# Patient Record
Sex: FEMALE | Employment: FULL TIME | URBAN - METROPOLITAN AREA
[De-identification: names, ages, dates, MRNs, and addresses within clinical notes are randomized per-mention and may not be internally consistent; named-entity substitution may affect disease eponyms.]

---

## 2020-09-18 ENCOUNTER — VIRTUAL VISIT (OUTPATIENT)
Dept: FAMILY MEDICINE CLINIC | Age: 30
End: 2020-09-18

## 2020-09-18 VITALS
BODY MASS INDEX: 26.03 KG/M2 | DIASTOLIC BLOOD PRESSURE: 70 MMHG | TEMPERATURE: 98.5 F | HEIGHT: 64 IN | OXYGEN SATURATION: 97 % | HEART RATE: 79 BPM | SYSTOLIC BLOOD PRESSURE: 110 MMHG | WEIGHT: 152.5 LBS

## 2020-09-18 DIAGNOSIS — G43.901 MIGRAINE WITH STATUS MIGRAINOSUS, NOT INTRACTABLE, UNSPECIFIED MIGRAINE TYPE: Primary | ICD-10-CM

## 2020-09-18 RX ORDER — SERTRALINE HYDROCHLORIDE 100 MG/1
TABLET, FILM COATED ORAL DAILY
COMMUNITY
End: 2020-12-17

## 2020-09-18 RX ORDER — CYCLOBENZAPRINE HCL 10 MG
10 TABLET ORAL
COMMUNITY
End: 2020-12-17

## 2020-09-18 RX ORDER — CHOLECALCIFEROL (VITAMIN D3) 125 MCG
CAPSULE ORAL
COMMUNITY

## 2020-09-18 RX ORDER — IBUPROFEN 800 MG/1
TABLET ORAL
COMMUNITY

## 2020-09-18 RX ORDER — BUTALBITAL, ACETAMINOPHEN AND CAFFEINE 50; 325; 40 MG/1; MG/1; MG/1
1 TABLET ORAL
COMMUNITY
End: 2021-01-22 | Stop reason: SDUPTHER

## 2020-09-21 ENCOUNTER — TELEPHONE (OUTPATIENT)
Dept: FAMILY MEDICINE CLINIC | Age: 30
End: 2020-09-21

## 2020-09-21 NOTE — TELEPHONE ENCOUNTER
Patient has a VV Friday bust missed it so called today for a referral to a neurologist for severe migrans,still having them face is even swollen.

## 2020-09-22 NOTE — TELEPHONE ENCOUNTER
Patient states that she does not have a headache right now, but had one yesterday for over 2 hours. She states that she thinks stress brought that one on. She was advised to go to . Patient states she needs to make an appt for a physical  As well. Patient was advised I could transfer her to the appt  and she states she will call back after she looks at her schedule to make that appt.

## 2020-10-05 ENCOUNTER — TELEPHONE (OUTPATIENT)
Dept: FAMILY MEDICINE CLINIC | Age: 30
End: 2020-10-05

## 2020-10-05 NOTE — TELEPHONE ENCOUNTER
Pt waiting on her COVID test to come back but still running a fever and if its negative can she follow up here?  Please advise

## 2020-12-17 ENCOUNTER — VIRTUAL VISIT (OUTPATIENT)
Dept: FAMILY MEDICINE CLINIC | Age: 30
End: 2020-12-17
Payer: COMMERCIAL

## 2020-12-17 DIAGNOSIS — G43.901 MIGRAINE WITH STATUS MIGRAINOSUS, NOT INTRACTABLE, UNSPECIFIED MIGRAINE TYPE: Primary | ICD-10-CM

## 2020-12-17 DIAGNOSIS — Z78.9 BREASTFEEDING (INFANT): ICD-10-CM

## 2020-12-17 PROCEDURE — 99213 OFFICE O/P EST LOW 20 MIN: CPT | Performed by: NURSE PRACTITIONER

## 2020-12-17 RX ORDER — TRIAMCINOLONE ACETONIDE 1 MG/G
OINTMENT TOPICAL
COMMUNITY
Start: 2020-11-16 | End: 2021-08-30

## 2020-12-17 RX ORDER — BUTALBITAL, ASPIRIN, AND CAFFEINE 325; 50; 40 MG/1; MG/1; MG/1
CAPSULE ORAL
COMMUNITY
Start: 2020-10-07 | End: 2021-01-05

## 2020-12-17 RX ORDER — ONDANSETRON 4 MG/1
TABLET, ORALLY DISINTEGRATING ORAL
COMMUNITY
Start: 2020-10-05 | End: 2022-05-12 | Stop reason: ALTCHOICE

## 2020-12-17 NOTE — PROGRESS NOTES
Consent: Hue Gabriel, who was seen by synchronous (real-time) audio-video technology, and/or her healthcare decision maker, is aware that this patient-initiated, Telehealth encounter on 12/17/2020 is a billable service, with coverage as determined by her insurance carrier. She is aware that she may receive a bill and has provided verbal consent to proceed: YES-Consent obtained within past 12 months        712  Subjective:   Hue Gabriel is a 31 y.o. female who was seen for Follow-up (migraine follow up, paperwork)  Patient presents to discuss [facility] paperwork for migraines. Migraines are occurring 5 times per month and have been worse due to PPE requirements at work (N95 and face shield). Migraines last 2 days and are accompanied by nausea, occassionally emesis and photosensitivity. She has missed 3 days of work this month and none last month. Last seen for migraines at [facility] in October and prescribed Fiorcet and Zofran. Fiorcet sometimes helps migraines but not always. Tylenol 1000mg once also offers some relief but does not resolved migraines. Also requesting refill of prenatal vitamins. Continues to pump once daily. Prior to Admission medications    Medication Sig Start Date End Date Taking? Authorizing Provider   butalbital-aspirin-caffeine (FIORINAL) capsule TK 1 TO 2 CS PO Q 4 H PRF HA. MDD 6 CS. 10/7/20  Yes Provider, Historical   ondansetron (ZOFRAN ODT) 4 mg disintegrating tablet DIS 1 T ON THE TONGUE Q 8 H PRF NAUSEA OR VOM 10/5/20  Yes Provider, Historical   triamcinolone acetonide (KENALOG) 0.1 % ointment BALAJI TO LEGS BID FOR 14 DAYS THEN PRF FLARES 11/16/20  Yes Provider, Historical   prenatal vit-calcium-iron-fa (PRENATAL PLUS with CALCIUM) 27 mg iron- 1 mg tab Take 1 Tab by mouth daily. 12/17/20  Yes Annette Colón NP   butalbital-acetaminophen-caffeine (FIORICET, ESGIC) -40 mg per tablet Take 1 Tab by mouth.    Yes Provider, Historical   cholecalciferol, vitamin D3, 50 mcg (2,000 unit) tab Take  by mouth. Yes Provider, Historical   ibuprofen (MOTRIN) 800 mg tablet Take  by mouth. Yes Provider, Historical   prenatal vit-calcium-iron-fa (PRENATAL PLUS with CALCIUM) 27 mg iron- 1 mg tab Take 1 Tab by mouth daily. 12/17/20  Provider, Historical   sertraline (ZOLOFT) 100 mg tablet Take  by mouth daily. 12/17/20  Provider, Historical   cyclobenzaprine (FLEXERIL) 10 mg tablet Take 10 mg by mouth three (3) times daily as needed for Muscle Spasm(s). 12/17/20  Provider, Historical     Allergies   Allergen Reactions    Aspirin Other (comments)     Patient Active Problem List    Diagnosis    Breastfeeding (infant)    Migraine    Vitamin D deficiency         ROS  See HPI for pertinent ROS. Objective:   Vital Signs: (As obtained by patient/caregiver at home)  There were no vitals taken for this visit.      [INSTRUCTIONS:  \"[x]\" Indicates a positive item  \"[]\" Indicates a negative item  -- DELETE ALL ITEMS NOT EXAMINED]    Constitutional: [x] Appears well-developed and well-nourished [x] No apparent distress      [] Abnormal -     Mental status: [x] Alert and awake  [x] Oriented to person/place/time [x] Able to follow commands    [] Abnormal -     Eyes:   EOM    [x]  Normal    [] Abnormal -   Sclera  [x]  Normal    [] Abnormal -          Discharge [x]  None visible   [] Abnormal -     HENT: [x] Normocephalic, atraumatic  [] Abnormal -   [x] Mouth/Throat: Mucous membranes are moist    External Ears [x] Normal  [] Abnormal -    Neck: [x] No visualized mass [] Abnormal -     Pulmonary/Chest: [x] Respiratory effort normal   [x] No visualized signs of difficulty breathing or respiratory distress        [] Abnormal -        Neurological:        [x] No Facial Asymmetry (Cranial nerve 7 motor function) (limited exam due to video visit)          [x] No gaze palsy        [] Abnormal -          Skin:        [x] No significant exanthematous lesions or discoloration noted on facial skin [] Abnormal -            Psychiatric:       [x] Normal Affect [] Abnormal -        [x] No Hallucinations    Other pertinent observable physical exam findings:-              Assessment & Plan:   Diagnoses and all orders for this visit:    1. Migraine with status migrainosus, not intractable, unspecified migraine type  Advised we will call when [facility] paperwork is available for pickup. Discouraged use of Fiorcet while breastfeeding. 2. Breastfeeding (infant)  Refill as requested. -     prenatal vit-calcium-iron-fa (PRENATAL PLUS with CALCIUM) 27 mg iron- 1 mg tab; Take 1 Tab by mouth daily. We discussed the expected course, resolution and complications of the diagnosis(es) in detail. Medication risks, benefits, costs, interactions, and alternatives were discussed as indicated. I advised her to contact the office if her condition worsens, changes or fails to improve as anticipated. She expressed understanding with the diagnosis(es) and plan. Jamal Stout is a 31 y.o. female being evaluated by a video visit encounter for concerns as above. A caregiver was present when appropriate. Due to this being a TeleHealth encounter (During [facility]- public health emergency), evaluation of the following organ systems was limited: Vitals/Constitutional/EENT/Resp/CV/GI//MS/Neuro/Skin/Heme-Lymph-Imm. Pursuant to the emergency declaration under the [facility], 1135 waiver authority and the [employer] and [employer] General Act, this Virtual  Visit was conducted, with patient's (and/or legal guardian's) consent, to reduce the patient's risk of exposure to COVID-19 and provide necessary medical care. Services were provided through a video synchronous discussion virtually to substitute for in-person clinic visit. Patient and provider were located at their individual homes.         Carlos Alberto Phillips NP

## 2021-01-05 ENCOUNTER — OFFICE VISIT (OUTPATIENT)
Dept: FAMILY MEDICINE CLINIC | Age: 31
End: 2021-01-05
Payer: COMMERCIAL

## 2021-01-05 ENCOUNTER — TELEPHONE (OUTPATIENT)
Dept: FAMILY MEDICINE CLINIC | Age: 31
End: 2021-01-05

## 2021-01-05 VITALS
WEIGHT: 137.25 LBS | OXYGEN SATURATION: 97 % | SYSTOLIC BLOOD PRESSURE: 124 MMHG | HEART RATE: 105 BPM | HEIGHT: 64 IN | DIASTOLIC BLOOD PRESSURE: 82 MMHG | BODY MASS INDEX: 23.43 KG/M2 | TEMPERATURE: 97.7 F

## 2021-01-05 DIAGNOSIS — G43.901 MIGRAINE WITH STATUS MIGRAINOSUS, NOT INTRACTABLE, UNSPECIFIED MIGRAINE TYPE: Primary | ICD-10-CM

## 2021-01-05 DIAGNOSIS — R45.89 TEARFULNESS: ICD-10-CM

## 2021-01-05 PROCEDURE — 99213 OFFICE O/P EST LOW 20 MIN: CPT | Performed by: NURSE PRACTITIONER

## 2021-01-05 RX ORDER — AMITRIPTYLINE HYDROCHLORIDE 25 MG/1
25 TABLET, FILM COATED ORAL
Qty: 90 TAB | Refills: 0 | Status: SHIPPED | OUTPATIENT
Start: 2021-01-05 | End: 2021-04-06

## 2021-01-05 NOTE — TELEPHONE ENCOUNTER
Pt FELISA paperwork was sent to her employer, but pt would like a copy sent to her personal fax [phone]

## 2021-01-05 NOTE — PROGRESS NOTES
Subjective  Chief Complaint   Patient presents with    Other     discuss migraines     HPI:  Disha Magana is a 31 y.o. female. Patient presents to discuss migraines. She has been off meds since pregnancy and breastfeeding. She has stopped breastfeeding and would like to restart daily migraine medication. Migraines are currently occurring 3-4 per month and last 1-2 days. Noticed an increase in migraines approx 3 weeks ago and able to identify fast food as trigger. Prior to pregnancy, migraines were occurring almost daily. Also expresses her sadness about stopping breastfeeding suddenly to restart migraine meds.        Past Medical History:   Diagnosis Date    Herpes genitalia     Kidney stones     Migraines     UTI (urinary tract infection)     Vitamin D deficiency      Family History   Problem Relation Age of Onset    No Known Problems Mother     No Known Problems Father     Hypertension Sister     Heart Disease Sister      Social History     Socioeconomic History    Marital status:      Spouse name: Not on file    Number of children: Not on file    Years of education: Not on file    Highest education level: Not on file   Occupational History    Not on file   Social Needs    Financial resource strain: Not on file    Food insecurity     Worry: Not on file     Inability: Not on file    Transportation needs     Medical: Not on file     Non-medical: Not on file   Tobacco Use    Smoking status: Never Smoker    Smokeless tobacco: Never Used   Substance and Sexual Activity    Alcohol use: Yes     Comment: on ocassion    Drug use: Never    Sexual activity: Yes   Lifestyle    Physical activity     Days per week: Not on file     Minutes per session: Not on file    Stress: Not on file   Relationships    Social connections     Talks on phone: Not on file     Gets together: Not on file     Attends [employer] service: Not on file     Active member of club or organization: Not on file Attends meetings of clubs or organizations: Not on file     Relationship status: Not on file    Intimate partner violence     Fear of current or ex partner: Not on file     Emotionally abused: Not on file     Physically abused: Not on file     Forced sexual activity: Not on file   Other Topics Concern    Not on file   Social History Narrative    Not on file     Current Outpatient Medications on File Prior to Visit   Medication Sig Dispense Refill    ondansetron (ZOFRAN ODT) 4 mg disintegrating tablet DIS 1 T ON THE TONGUE Q 8 H PRF NAUSEA OR VOM      triamcinolone acetonide (KENALOG) 0.1 % ointment BALAJI TO LEGS BID FOR 14 DAYS THEN PRF FLARES      prenatal vit-calcium-iron-fa (PRENATAL PLUS with CALCIUM) 27 mg iron- 1 mg tab Take 1 Tab by mouth daily. 90 Tab 3    butalbital-acetaminophen-caffeine (FIORICET, ESGIC) -40 mg per tablet Take 1 Tab by mouth.  cholecalciferol, vitamin D3, 50 mcg (2,000 unit) tab Take  by mouth.  ibuprofen (MOTRIN) 800 mg tablet Take  by mouth.  [DISCONTINUED] butalbital-aspirin-caffeine (FIORINAL) capsule TK 1 TO 2 CS PO Q 4 H PRF HA. MDD 6 CS. No current facility-administered medications on file prior to visit. Allergies   Allergen Reactions    Aspirin Other (comments)     ROS  See HPI for pertinent ROS. Objective  Physical Exam  Vitals signs and nursing note reviewed. Constitutional:       General: She is not in acute distress. Appearance: Normal appearance. She is normal weight. HENT:      Head: Normocephalic. Eyes:      Extraocular Movements: Extraocular movements intact. Cardiovascular:      Rate and Rhythm: Normal rate and regular rhythm. Heart sounds: Normal heart sounds. Pulmonary:      Effort: Pulmonary effort is normal.      Breath sounds: Normal breath sounds. Musculoskeletal: Normal range of motion. Right lower leg: No edema. Left lower leg: No edema. Skin:     General: Skin is warm and dry. Neurological:      Mental Status: She is alert and oriented to person, place, and time. Psychiatric:         Mood and Affect: Affect is tearful. Behavior: Behavior normal.          Assessment & Plan      ICD-10-CM ICD-9-CM    1. Migraine with status migrainosus, not intractable, unspecified migraine type  G43.901 346.92 amitriptyline (ELAVIL) 25 mg tablet   2. Tearfulness  R45.89 780.99      Diagnoses and all orders for this visit:    1. Migraine with status migrainosus, not intractable, unspecified migraine type  Restart amitriptyline as ordered. Reminded it may take several weeks for her to notice any improvement. Okay to increase dose to 50 mg daily after 2 to 4 weeks if needed. -     amitriptyline (ELAVIL) 25 mg tablet; Take 1 Tab by mouth nightly. 2. Tearfulness  Reassured her feelings are normal and partly due to hormones. Amitriptyline does have an indication for depression which may help. Follow-up and Dispositions    · Return in about 1 month (around 2/5/2021) for follow up migraines, nonfasting.            Benjamin Mackey NP

## 2021-01-11 ENCOUNTER — TELEPHONE (OUTPATIENT)
Dept: FAMILY MEDICINE CLINIC | Age: 31
End: 2021-01-11

## 2021-01-11 DIAGNOSIS — G43.919 INTRACTABLE MIGRAINE WITHOUT STATUS MIGRAINOSUS, UNSPECIFIED MIGRAINE TYPE: Primary | ICD-10-CM

## 2021-01-22 ENCOUNTER — VIRTUAL VISIT (OUTPATIENT)
Dept: FAMILY MEDICINE CLINIC | Age: 31
End: 2021-01-22
Payer: COMMERCIAL

## 2021-01-22 DIAGNOSIS — G43.901 MIGRAINE WITH STATUS MIGRAINOSUS, NOT INTRACTABLE, UNSPECIFIED MIGRAINE TYPE: ICD-10-CM

## 2021-01-22 DIAGNOSIS — E16.2 HYPOGLYCEMIA: Primary | ICD-10-CM

## 2021-01-22 PROCEDURE — 99213 OFFICE O/P EST LOW 20 MIN: CPT | Performed by: NURSE PRACTITIONER

## 2021-01-22 RX ORDER — PRENATAL VIT 96/IRON FUM/FOLIC 27MG-0.8MG
TABLET ORAL
COMMUNITY
Start: 2021-01-06 | End: 2021-08-30

## 2021-01-22 RX ORDER — BUTALBITAL, ACETAMINOPHEN AND CAFFEINE 50; 325; 40 MG/1; MG/1; MG/1
TABLET ORAL
Qty: 30 TAB | Refills: 0 | Status: SHIPPED | OUTPATIENT
Start: 2021-01-22 | End: 2021-11-23

## 2021-01-22 NOTE — PROGRESS NOTES
Consent: Michelle Capellan, who was seen by synchronous (real-time) audio-video technology, and/or her healthcare decision maker, is aware that this patient-initiated, Telehealth encounter on 1/22/2021 is a billable service, with coverage as determined by her insurance carrier. She is aware that she may receive a bill and has provided verbal consent to proceed: YES-Consent obtained within past 12 months        712  Subjective:   Michelle Capellan is a 31 y.o. female who was seen for Other (sweaty, shaky, weak, low blood sugar)  Patient presents with concerns about episodes of weakness, dizziness, and shakiness. Blood sugar during episodes are 60-75. Symptoms occur with hunger only and improve once she eats. She is also requesting a refill of Fiorcet for migraines. Fiorcet and a cup of coffee at onset of migraines have been working well as acute treatment. Originally this was prescribed by her OB. Has not scheduled with neurology, unsure who referral was for. Prior to Admission medications    Medication Sig Start Date End Date Taking? Authorizing Provider   prenatal VONJ69/RICHA fum/folic (prenatal vitamin) 27 mg iron- 800 mcg tab tablet TAKE 1 TABLET BY MOUTH DAILY 1/6/21  Yes Provider, Historical   butalbital-acetaminophen-caffeine (FIORICET, ESGIC) -40 mg per tablet Take 1 tablet every 4 hours as needed for migraine. Not to exceed 6 tablets per day. 1/22/21  Yes Cinthya Max NP   amitriptyline (ELAVIL) 25 mg tablet Take 1 Tab by mouth nightly. 1/5/21  Yes Cinthya Max NP   ondansetron (ZOFRAN ODT) 4 mg disintegrating tablet DIS 1 T ON THE TONGUE Q 8 H PRF NAUSEA OR VOM 10/5/20  Yes Provider, Historical   triamcinolone acetonide (KENALOG) 0.1 % ointment BALAJI TO LEGS BID FOR 14 DAYS THEN PRF FLARES 11/16/20  Yes Provider, Historical   prenatal vit-calcium-iron-fa (PRENATAL PLUS with CALCIUM) 27 mg iron- 1 mg tab Take 1 Tab by mouth daily.  12/17/20  Yes Cinthya Max NP   cholecalciferol, vitamin D3, 50 mcg (2,000 unit) tab Take  by mouth. Yes Provider, Historical   ibuprofen (MOTRIN) 800 mg tablet Take  by mouth. Yes Provider, Historical   butalbital-acetaminophen-caffeine (FIORICET, ESGIC) -40 mg per tablet Take 1 Tab by mouth.  1/22/21  Provider, Historical     Allergies   Allergen Reactions    Aspirin Other (comments)     Patient Active Problem List    Diagnosis    Breastfeeding (infant)    Migraine    Vitamin D deficiency         ROS  See HPI for pertinent ROS. Objective:   Vital Signs: (As obtained by patient/caregiver at home)  There were no vitals taken for this visit.      [INSTRUCTIONS:  \"[x]\" Indicates a positive item  \"[]\" Indicates a negative item  -- DELETE ALL ITEMS NOT EXAMINED]    Constitutional: [x] Appears well-developed and well-nourished [x] No apparent distress      [] Abnormal -     Mental status: [x] Alert and awake  [x] Oriented to person/place/time [x] Able to follow commands    [] Abnormal -     Eyes:   EOM    [x]  Normal    [] Abnormal -   Sclera  [x]  Normal    [] Abnormal -          Discharge [x]  None visible   [] Abnormal -     HENT: [x] Normocephalic, atraumatic  [] Abnormal -   [x] Mouth/Throat: Mucous membranes are moist    External Ears [x] Normal  [] Abnormal -    Neck: [x] No visualized mass [] Abnormal -     Pulmonary/Chest: [x] Respiratory effort normal   [x] No visualized signs of difficulty breathing or respiratory distress        [] Abnormal -        Neurological:        [x] No Facial Asymmetry (Cranial nerve 7 motor function) (limited exam due to video visit)          [x] No gaze palsy        [] Abnormal -          Skin:        [x] No significant exanthematous lesions or discoloration noted on facial skin         [] Abnormal -            Psychiatric:       [x] Normal Affect [] Abnormal -        [x] No Hallucinations    Other pertinent observable physical exam findings:-              Assessment & Plan:   Diagnoses and all orders for this visit: 1. Hypoglycemia  Symptoms and glucose are consistent with hypoglycemia. Discussed diagnosis of hypoglycemia is made on history, there is no specific test to diagnose. Eat 6 smaller meals versus 3 large meals throughout the day to maintain steady glucose levels. Eat mostly proteins. Carry snack (ie. peanut butter crackers or sweet treat) at all times to eat during hypoglycemic episodes. 2. Migraine with status migrainosus, not intractable, unspecified migraine type    checked, no misuse or abuse noted. Last refilled #30 10/2020. Medication refilled as requested. Office staff will call with referral information.   -     butalbital-acetaminophen-caffeine (FIORICET, ESGIC) -40 mg per tablet; Take 1 tablet every 4 hours as needed for migraine. Not to exceed 6 tablets per day. We discussed the expected course, resolution and complications of the diagnosis(es) in detail. Medication risks, benefits, costs, interactions, and alternatives were discussed as indicated. I advised her to contact the office if her condition worsens, changes or fails to improve as anticipated. She expressed understanding with the diagnosis(es) and plan. Chemo Villa is a 31 y.o. female being evaluated by a video visit encounter for concerns as above. A caregiver was present when appropriate. Due to this being a TeleHealth encounter (During [facility]-01 public health emergency), evaluation of the following organ systems was limited: Vitals/Constitutional/EENT/Resp/CV/GI//MS/Neuro/Skin/Heme-Lymph-Imm. Pursuant to the emergency declaration under the [facility][note 3][facility], 1135 waiver authority and the [facility] and [employer] Act, this Virtual  Visit was conducted, with patient's (and/or legal guardian's) consent, to reduce the patient's risk of exposure to COVID-19 and provide necessary medical care.      Services were provided through a video synchronous discussion virtually to substitute for in-person clinic visit. Patient and provider were located at their individual homes.         John Em NP

## 2021-04-03 DIAGNOSIS — G43.901 MIGRAINE WITH STATUS MIGRAINOSUS, NOT INTRACTABLE, UNSPECIFIED MIGRAINE TYPE: ICD-10-CM

## 2021-04-06 RX ORDER — AMITRIPTYLINE HYDROCHLORIDE 25 MG/1
TABLET, FILM COATED ORAL
Qty: 90 TAB | Refills: 0 | Status: SHIPPED | OUTPATIENT
Start: 2021-04-06 | End: 2021-08-10

## 2021-08-09 DIAGNOSIS — G43.901 MIGRAINE WITH STATUS MIGRAINOSUS, NOT INTRACTABLE, UNSPECIFIED MIGRAINE TYPE: ICD-10-CM

## 2021-08-10 RX ORDER — AMITRIPTYLINE HYDROCHLORIDE 25 MG/1
TABLET, FILM COATED ORAL
Qty: 90 TABLET | Refills: 0 | Status: SHIPPED | OUTPATIENT
Start: 2021-08-10 | End: 2021-08-30 | Stop reason: ALTCHOICE

## 2021-08-25 ENCOUNTER — TELEPHONE (OUTPATIENT)
Dept: FAMILY MEDICINE CLINIC | Age: 31
End: 2021-08-25

## 2021-08-25 NOTE — TELEPHONE ENCOUNTER
Patient went to [facility] for COVID test, she is negative. Son tested positive on Monday 08/23/21  Boyfriend tested positive on Friday 08/20/21    She would like advice on what she should be do next?     Please call [phone]

## 2021-08-30 ENCOUNTER — OFFICE VISIT (OUTPATIENT)
Dept: FAMILY MEDICINE CLINIC | Age: 31
End: 2021-08-30
Payer: COMMERCIAL

## 2021-08-30 VITALS
TEMPERATURE: 97.6 F | RESPIRATION RATE: 14 BRPM | OXYGEN SATURATION: 98 % | HEART RATE: 102 BPM | WEIGHT: 128 LBS | BODY MASS INDEX: 21.85 KG/M2 | HEIGHT: 64 IN | DIASTOLIC BLOOD PRESSURE: 80 MMHG | SYSTOLIC BLOOD PRESSURE: 120 MMHG

## 2021-08-30 DIAGNOSIS — Z20.822 CONTACT WITH AND (SUSPECTED) EXPOSURE TO COVID-19: ICD-10-CM

## 2021-08-30 DIAGNOSIS — G43.901 MIGRAINE WITH STATUS MIGRAINOSUS, NOT INTRACTABLE, UNSPECIFIED MIGRAINE TYPE: Primary | ICD-10-CM

## 2021-08-30 DIAGNOSIS — Z23 NEED FOR COVID-19 VACCINE: ICD-10-CM

## 2021-08-30 PROCEDURE — 99214 OFFICE O/P EST MOD 30 MIN: CPT | Performed by: NURSE PRACTITIONER

## 2021-08-30 RX ORDER — AMITRIPTYLINE HYDROCHLORIDE 50 MG/1
50 TABLET, FILM COATED ORAL
Qty: 90 TABLET | Refills: 1 | Status: SHIPPED | OUTPATIENT
Start: 2021-08-30 | End: 2021-10-27 | Stop reason: SDUPTHER

## 2021-08-30 NOTE — PATIENT INSTRUCTIONS
Migraine Headache: Care Instructions  Overview     Migraines are painful, throbbing headaches that often start on one side of the head. They may cause nausea and vomiting and make you sensitive to light, sound, or smell. Without treatment, migraines can last from 4 hours to a few days. Medicines can help prevent migraines or stop them after they have started. Your doctor can help you find which ones work best for you. Follow-up care is a key part of your treatment and safety. Be sure to make and go to all appointments, and call your doctor if you are having problems. It's also a good idea to know your test results and keep a list of the medicines you take. How can you care for yourself at home? · Do not drive if you have taken a prescription pain medicine. · Rest in a quiet, dark room until your headache is gone. Close your eyes, and try to relax or go to sleep. Don't watch TV or read. · Put a cold, moist cloth or cold pack on the painful area for 10 to 20 minutes at a time. Put a thin cloth between the cold pack and your skin. · Use a warm, moist towel or a heating pad set on low to relax tight shoulder and neck muscles. · Have someone gently massage your neck and shoulders. · Take your medicines exactly as prescribed. Call your doctor if you think you are having a problem with your medicine. You will get more details on the specific medicines your doctor prescribes. · Don't take medicine for headache pain too often. Talk to your doctor if you are taking medicine more than 2 days a week to stop a headache. Taking too much pain medicine can lead to more headaches. These are called medicine-overuse headaches. To prevent migraines  · Keep a headache diary so you can figure out what triggers your headaches. Avoiding triggers may help you prevent headaches. Record when each headache began, how long it lasted, and what the pain was like.  Write down any other symptoms you had with the headache, such as nausea, flashing lights or dark spots, or sensitivity to bright light or loud noise. Note if the headache occurred near your period. List anything that might have triggered the headache. Triggers may include certain foods (chocolate, cheese, wine) or odors, smoke, bright light, stress, or lack of sleep. · If your doctor has prescribed medicine for your migraines, take it as directed. You may have medicine that you take only when you get a migraine and medicine that you take all the time to help prevent migraines. ? If your doctor has prescribed medicine for when you get a headache, take it at the first sign of a migraine, unless your doctor has given you other instructions. ? If your doctor has prescribed medicine to prevent migraines, take it exactly as prescribed. Call your doctor if you think you are having a problem with your medicine. · Find healthy ways to deal with stress. Migraines are most common during or right after stressful times. Try finding ways to reduce stress like practicing mindfulness or deep breathing exercises. · Get plenty of sleep and exercise. But be careful to not push yourself too hard during exercise. It may trigger a headache. · Eat meals on a regular schedule. Avoid foods and drinks that often trigger migraines. These include chocolate, alcohol (especially red wine and port), aspartame, monosodium glutamate (MSG), and some additives found in foods (such as hot dogs, cortes, cold cuts, aged cheeses, and pickled foods). · Limit caffeine. Don't drink too much coffee, tea, or soda. But don't quit caffeine suddenly. That can also give you migraines. · Do not smoke or allow others to smoke around you. If you need help quitting, talk to your doctor about stop-smoking programs and medicines. These can increase your chances of quitting for good. · If you are taking birth control pills or hormone therapy, talk to your doctor about whether they are triggering your migraines.   When should you call for help? Call 911 anytime you think you may need emergency care. For example, call if:    · You have signs of a stroke. These may include:  ? Sudden numbness, paralysis, or weakness in your face, arm, or leg, especially on only one side of your body. ? Sudden vision changes. ? Sudden trouble speaking. ? Sudden confusion or trouble understanding simple statements. ? Sudden problems with walking or balance. ? A sudden, severe headache that is different from past headaches. Call your doctor now or seek immediate medical care if:    · You have new or worse nausea and vomiting.     · You have a new or higher fever.     · Your headache gets much worse. Watch closely for changes in your health, and be sure to contact your doctor if:    · You are not getting better after 2 days (48 hours). Where can you learn more? Go to http://www.gray.com/  Enter C003 in the search box to learn more about \"Migraine Headache: Care Instructions. \"  Current as of: August 4, 2020               Content Version: 12.8  © 2006-2021 [address] Care instructions adapted under license by [employer] (which disclaims liability or warranty for this information). If you have questions about a medical condition or this instruction, always ask your healthcare professional. [address] any warranty or liability for your use of this information.

## 2021-08-30 NOTE — PROGRESS NOTES
Chief Complaint   Patient presents with    Migraine     1. Have you been to the ER, urgent care clinic since your last visit? Hospitalized since your last visit? Yes [facility]    2. Have you seen or consulted any other health care providers outside of the [address] [place] [address] since your last visit? Include any pap smears or colon screening.  No

## 2021-08-30 NOTE — LETTER
NOTIFICATION RETURN TO WORK / SCHOOL    8/30/2021 5:17 PM    Ms. Eugenia Mosley  [facility] [address]  [facility] [address]      To Whom It May Concern:    Eugenia Mosley is currently under the care of [address] [place] [address]. She will return to work on:  9/2/2021    If there are questions or concerns please have the patient contact our office.         Sincerely,      Lola Billings NP
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
## 2021-10-18 ENCOUNTER — TELEPHONE (OUTPATIENT)
Dept: FAMILY MEDICINE CLINIC | Age: 31
End: 2021-10-18

## 2021-10-18 DIAGNOSIS — G43.901 MIGRAINE WITH STATUS MIGRAINOSUS, NOT INTRACTABLE, UNSPECIFIED MIGRAINE TYPE: Primary | ICD-10-CM

## 2021-10-18 NOTE — TELEPHONE ENCOUNTER
----- Message from Lauryn Zapata sent at 10/18/2021 11:11 AM EDT -----  Subject: Message to Provider    QUESTIONS  Information for Provider? pt needs to schedule a F/U from the ED visit   Friday 10/15 due to migraine that was worst she has had Please call with   available day and time to see PCP ED  called it a complex migraine,   symptoms of stroke  ---------------------------------------------------------------------------  --------------  CALL BACK INFO  What is the best way for the office to contact you? OK to leave message on   voicemail  Preferred Call Back Phone Number?  [phone]  ---------------------------------------------------------------------------  --------------  SCRIPT ANSWERS  undefined

## 2021-10-18 NOTE — TELEPHONE ENCOUNTER
Pt phoned stating that she needs a referral faxed over to Dr. Pat Escalera marked urgent from her PCP so that office can get her an appt for Dec. Pt needs to be see by Dr. Mark Wolfe pt needs to schedule a F/U from the ED visit Friday 10/15 due to migraine that was worse she has had Please call with when referral has been faxed over.  PCP ED Dr called it a complex migraine, symptoms of stroke  Fax #- [phone]

## 2021-10-19 NOTE — TELEPHONE ENCOUNTER
Patient advised. Patient states that ER told her that she needed to get in sooner. I called Dr Anibal Morrison office and they do NOT have any sooner New patient appointments and the [facility] did state that the referring physician could reach out to Dr. Anibal Morrison to try to see if patient could get in any sooner.

## 2021-10-20 ENCOUNTER — TELEPHONE (OUTPATIENT)
Dept: FAMILY MEDICINE CLINIC | Age: 31
End: 2021-10-20

## 2021-10-20 NOTE — TELEPHONE ENCOUNTER
Patient called stating that she had some questions and requests that I tried to explain that I do not have the answers to (clinical). Please call.

## 2021-10-21 NOTE — TELEPHONE ENCOUNTER
Called pt, waiting on call back based on a message today pt wants an appt but will still wait for her call

## 2021-10-22 NOTE — TELEPHONE ENCOUNTER
Pt was an Appt with Dr Gina Stahl of Neurology on Dec 2nd. Pt was in the hospital and they believe she might have had a stroke. Pt experienced swelling in face and neck for 3 to 4 days in the hospital they told her to follow up with neurology asap. The dec 2nd is the soonest she could get in with [facility]. Pt called us but felt her pcp did not care and would not write a letter to the facility. Wants to see a new person in hopes they will give her new care

## 2021-10-27 ENCOUNTER — TELEPHONE (OUTPATIENT)
Dept: FAMILY MEDICINE CLINIC | Age: 31
End: 2021-10-27

## 2021-10-27 ENCOUNTER — OFFICE VISIT (OUTPATIENT)
Dept: FAMILY MEDICINE CLINIC | Age: 31
End: 2021-10-27
Payer: COMMERCIAL

## 2021-10-27 VITALS
SYSTOLIC BLOOD PRESSURE: 114 MMHG | BODY MASS INDEX: 21.68 KG/M2 | HEIGHT: 64 IN | DIASTOLIC BLOOD PRESSURE: 75 MMHG | TEMPERATURE: 98.2 F | RESPIRATION RATE: 16 BRPM | WEIGHT: 127 LBS | OXYGEN SATURATION: 98 % | HEART RATE: 96 BPM

## 2021-10-27 DIAGNOSIS — G43.901 MIGRAINE WITH STATUS MIGRAINOSUS, NOT INTRACTABLE, UNSPECIFIED MIGRAINE TYPE: ICD-10-CM

## 2021-10-27 DIAGNOSIS — G43.101 COMPLICATED MIGRAINE WITH STATUS MIGRAINOSUS: Primary | ICD-10-CM

## 2021-10-27 PROCEDURE — 99214 OFFICE O/P EST MOD 30 MIN: CPT | Performed by: NURSE PRACTITIONER

## 2021-10-27 RX ORDER — LASMIDITAN 100 MG/1
1 TABLET ORAL
Qty: 8 EACH | Refills: 1 | Status: SHIPPED | OUTPATIENT
Start: 2021-10-27 | End: 2022-01-26

## 2021-10-27 RX ORDER — MELATONIN 5 MG
CAPSULE ORAL
COMMUNITY

## 2021-10-27 RX ORDER — AMITRIPTYLINE HYDROCHLORIDE 75 MG/1
75 TABLET, FILM COATED ORAL
Qty: 90 TABLET | Refills: 1 | Status: SHIPPED | OUTPATIENT
Start: 2021-10-27 | End: 2022-01-26 | Stop reason: SDUPTHER

## 2021-10-27 NOTE — TELEPHONE ENCOUNTER
----- Message from Rachna Cortes sent at 10/26/2021  4:55 PM EDT -----  Subject: Message to Provider    QUESTIONS  Information for Provider? patient called because the believed that their   appointment for 10/26/2021 was at 4:45pm but it was for 4:30pm so they   missed due to mistake  ---------------------------------------------------------------------------  --------------  [address]  What is the best way for the office to contact you? OK to leave message on   voicemail  Preferred Call Back Phone Number?  [phone]  ---------------------------------------------------------------------------  --------------  SCRIPT ANSWERS  undefined

## 2021-10-27 NOTE — LETTER
NOTIFICATION RETURN TO WORK     10/27/2021 4:40 PM    Ms. Chemo Villa  [address]  [facility] [address]      To Whom It May Concern:    Chemo Villa is currently under the care of myself Molly Nieto NP. Patient was evaluated in the office today 10/27/2021. She will return to work on: 10/28/2021    If there are questions or concerns please have the patient contact our office.         Sincerely,      Molly Nieto NP

## 2021-10-27 NOTE — PROGRESS NOTES
Subjective  Chief Complaint   Patient presents with    Neck Pain     HPI:  Florecita Crawford is a 27 y.o. female. 10/15 experienced a warm rushing \"sore\" pain that started to the back of head to the frontal area. In right eye she saw a shadow of water that was not there. Took two Tylenol and became nauseous. Her HR was 140 and had palpitations with low BP at 96/60. Went to the ER and the left side of her face was swollen. There was a CT scan done and was diagnosed with complex migraine. She was vomiting from the 16th to the 20th continuously. The pain changed from burning to pressure that felt a lot like sinus pressure on the side of head. Headache cleared up after two days. Started the Nurtec and felt much better and took every 3 days. She was having mood swings and not eating while on the medication. Started taking multivitamins and taking more water. Boyfriend does not like her moods and lack of appetite on the Nurtec. The feeling of \"tightness and pressure\" is on the back of her head. Patient supports her family in [facility]. Patient is stressed because of the responsibility and is working over time to help out her family. She feels guilty that she was adopted and moved to the [address] [place] [address] and feels that she has to take care of them. Patient became tearful during the interview. Does take Tylenol and has Ultram from the Emergency Room. Takes melatonin to help her sleep. The physician at the ED told her that she needs to see a neurologist. She needs a referral to get an appointment sooner.      Objective  Visit Vitals  /75 (BP 1 Location: Left upper arm, BP Patient Position: Sitting)   Pulse 96   Temp 98.2 °F (36.8 °C) (Axillary)   Resp 16   Ht 5' 4\" (1.626 m)   Wt 127 lb (57.6 kg)   SpO2 98%   BMI 21.80 kg/m²

## 2021-10-27 NOTE — PROGRESS NOTES
Chief Complaint   Patient presents with    Neck Pain     Visit Vitals  /75 (BP 1 Location: Left upper arm, BP Patient Position: Sitting)   Pulse 96   Temp 98.2 °F (36.8 °C) (Axillary)   Resp 16   Ht 5' 4\" (1.626 m)   Wt 127 lb (57.6 kg)   SpO2 98%   BMI 21.80 kg/m²     1. Have you been to the ER, urgent care clinic since your last visit? Hospitalized since your last visit? Yes When: 10/15/2021 patient went to [facility]    2. Have you seen or consulted any other health care providers outside of the [address] [place] [address] since your last visit? Include any pap smears or colon screening.  Yes When: 10/15/2021

## 2021-10-27 NOTE — LETTER
10/27/2021 4:27 PM    RE:    [address]  [address] [address]      Thank you for agreeing to see Romina Rodas    I am URGENTLY referring my patient to you for evaluation of migraines. Please see her  pertinent patient information below. Medical History:     Complicated Migraine with status migrainosus        I appreciate your assistance in Ms. Aguilar's care  and look forward to your findings and recommendations.         Sincerely,      Zoanne Litten, NP

## 2021-10-27 NOTE — TELEPHONE ENCOUNTER
Stated that she was returning Luda's call and that it is okay to leave a message, that this is her private voicemail on her cell phone.

## 2021-10-27 NOTE — PROGRESS NOTES
Subjective  Chief Complaint   Patient presents with    Migraine      HPI:  Cintia Singh is a 27 y.o. female. 10/15 experienced a warm rushing \"sore\" pain that started to the back of head to the frontal area. In right eye she saw a shadow of water that was not there. Took two Tylenol and became nauseous. Her HR was 140 and had palpitations with low BP at 96/60. Went to the [facility] ER and the left side of her face was swollen. There was a CT scan done and was diagnosed with complex migraine, a stroke was r/o. She was vomiting from the 16th to the 20th continuously. The pain changed from burning to pressure that felt a lot like sinus pressure on the side of head. Headache cleared up after two days. Started the Nurtec and felt much better and took every 3 days. She was having mood swings and not eating while on the medication. Her boyfriend does not like her moods and lack of appetite on the Nurtec. She has since run out of nurtec, is not seeking a new rx. She c/o a current h/a with a feeling of \"tightness and pressure\" is on the back of her head, but is improved from onset.       Patient supports her family in [facility]. Patient is stressed because of the responsibility and is working over time to help out her family. She feels guilty that she was adopted and moved to the [address] [place] [address],[address] and feels that she has to take care of them. Patient became tearful during the interview. Does take Tylenol and has Ultram from the Emergency Room both of which provide some relief. Takes melatonin to help her sleep. Started taking multivitamins and taking more water which she feels is also helpful. The physician at the ED told her that she needs to see a neurologist. She has an appt scheduled for Dec 2 with [facility] neurology, but feels this is too long of a wait to be seen.   She needs a referral to get an appointment sooner.      Objective  Visit Vitals  /75 (BP 1 Location: Left upper arm, BP Patient Position: Sitting)   Pulse 96   Temp 98.2 °F (36.8 °C) (Axillary)   Resp 16   Ht 5' 4\" (1.626 m)   Wt 127 lb (57.6 kg)   SpO2 98%   BMI 21.80 kg/m²      Physical Exam  Constitutional:       Appearance: Normal appearance. She is normal weight. HENT:      Right Ear: External ear normal.      Left Ear: External ear normal.      Nose: Nose normal.   Eyes:      General: Vision grossly intact. No scleral icterus. Right eye: No discharge. Left eye: No discharge. Extraocular Movements:      Right eye: Normal extraocular motion and no nystagmus. Left eye: Normal extraocular motion and no nystagmus. Conjunctiva/sclera: Conjunctivae normal.      Pupils: Pupils are equal, round, and reactive to light. Cardiovascular:      Rate and Rhythm: Normal rate and regular rhythm. Pulses: Normal pulses. Heart sounds: Normal heart sounds. Pulmonary:      Effort: Pulmonary effort is normal.      Breath sounds: Normal breath sounds. Abdominal:      General: Bowel sounds are normal.      Palpations: Abdomen is soft. Musculoskeletal:         General: No swelling, tenderness, deformity or signs of injury. Cervical back: Normal range of motion and neck supple. Skin:     General: Skin is warm and dry. Neurological:      General: No focal deficit present. Mental Status: She is alert and oriented to person, place, and time. Cranial Nerves: No cranial nerve deficit. Sensory: No sensory deficit. Motor: No weakness. Coordination: Coordination normal.      Gait: Gait normal.   Psychiatric:         Mood and Affect: Mood normal.         Behavior: Behavior normal.         Thought Content: Thought content normal.         Judgment: Judgment normal.         Diagnoses and all orders for this visit:    1. Complicated migraine with status migrainosus  -     lasmiditan (Reyvow) 100 mg tab; Take 1 Tablet by mouth daily as needed for PRN Reason (Other) (migraine).  PRN/1 DOSE PER 24 HOURS  Indications: a migraine headache    2. Migraine with status migrainosus, not intractable, unspecified migraine type  -     amitriptyline (ELAVIL) 75 mg tablet; Take 1 Tablet by mouth nightly as needed for Sleep. Indications: migraine prevention    I would prefer pt cont on nurtec since she reports symptoms resolution while on medication. However pt reports this make home life difficult with significant other due to the decrease in appetite, and this is extremely unfortunate, nor a common side effect. I was hesitant to try another GCRP agonist as she would like experience the same side effects. I have been ramping up her elavil slowly, and offered an rx for reyvow, noting its use isn't studied for more than 4x a month, the risks for [facility] and symptoms of serotonin syndrome. Advised if she experiences any of these issues to immediately d/c meds and seek ED.   Additionally I provided letter request pt receive urgent eval at neurology

## 2021-11-23 ENCOUNTER — OFFICE VISIT (OUTPATIENT)
Dept: NEUROLOGY | Age: 31
End: 2021-11-23
Payer: COMMERCIAL

## 2021-11-23 ENCOUNTER — TELEPHONE (OUTPATIENT)
Dept: NEUROLOGY | Age: 31
End: 2021-11-23

## 2021-11-23 VITALS
BODY MASS INDEX: 22.02 KG/M2 | RESPIRATION RATE: 16 BRPM | HEART RATE: 97 BPM | SYSTOLIC BLOOD PRESSURE: 114 MMHG | DIASTOLIC BLOOD PRESSURE: 69 MMHG | WEIGHT: 129 LBS | HEIGHT: 64 IN | OXYGEN SATURATION: 98 %

## 2021-11-23 DIAGNOSIS — G43.111 INTRACTABLE MIGRAINE WITH AURA WITH STATUS MIGRAINOSUS: Primary | ICD-10-CM

## 2021-11-23 PROCEDURE — 99244 OFF/OP CNSLTJ NEW/EST MOD 40: CPT | Performed by: PSYCHIATRY & NEUROLOGY

## 2021-11-23 RX ORDER — FREMANEZUMAB-VFRM 225 MG/1.5ML
225 INJECTION SUBCUTANEOUS
Qty: 1.5 ML | Refills: 3 | Status: SHIPPED | OUTPATIENT
Start: 2021-11-23 | End: 2022-01-31

## 2021-11-23 RX ORDER — RIMEGEPANT SULFATE 75 MG/75MG
TABLET, ORALLY DISINTEGRATING ORAL
Qty: 8 TABLET | Refills: 3 | Status: SHIPPED | OUTPATIENT
Start: 2021-11-23 | End: 2022-05-12 | Stop reason: ALTCHOICE

## 2021-11-23 RX ORDER — FREMANEZUMAB-VFRM 225 MG/1.5ML
225 INJECTION SUBCUTANEOUS ONCE
Qty: 1.5 ML | Refills: 0 | Status: SHIPPED | COMMUNITY
Start: 2021-11-23 | End: 2021-11-23

## 2021-11-23 NOTE — PROGRESS NOTES
[facility] and [address] at [facility] at [address] [facility], [address] [facility] [address] [facility], [address] [place] [address]  [phone] Office  [phone]           Referring: Champ Meier [address] [place] [address],  [address] [place] [address]    Chief Complaint   Patient presents with   [facility]     Room 12// Migraines- referred by PCP office // Saw Neuro 5 yrs ago @ [facility] Migraine     Elavil 75 mg nightly and Fioricet PRN// Unable to fill new Betsy Valladares // Had S.E. on Nurtec // Daily HA's, migraines // [facility] Oct 13h ER   41-year-old lady presents today for evaluation of headaches. She notes that since she was 14years of age she is suffered with headaches. She notes they have been worsening. She has been going to the emergency department at times for intractable headaches. Typically the located one side or the other. Notes intense pain. They can last up to 4 days and then she ought to go to the emergency room. Average headache will last at least 24 hours if not 48 hours. She has associated photophobia phonophobia nausea and vomiting. She recently had an episode where she had a shadow of water over the right eye and felt this warm sensation over her head. This occurred at work. She then had an intense headache. She went to the emergency room at [facility] where she had an MRI. It was negative. She was told she had complicated migraine. She was given Nurtec. The Nurtec she said works with then 5 to 30 minutes to take the headache away. She then says that it made her irritable and her boyfriend had the pills from her because it made her irritable and gave her an attitude. She says it is really only thing that took the headaches away.   In terms of prevention she gets her mother on the phone and she notes that she is only been on increasing doses of Elavil over the years for headache prevention. For abortive therapy she was given Zomig the Nurtec and then caffeine pills. No focal deficits. Record review finds office visit with NP [facility]  of this year where she reported a warm rushing sore pain in the back of the head in the frontal area. Had increased heart rate and palpitations and low blood pressure. Went to the ER at [facility] where she had a head CT and diagnosed with complex migraine. Had vomiting. Had some Nurtec and felt better and took every 3 days and then had mood swings and was not eating well taking the medicine and her boyfriend apparently did not like her moods and lack of appetite on the Nurtec. She was using Tylenol and Ultram and was told she needed to see a neurologist from the emergency room and had an appointment at [facility] neurology in December but felt that was too long to wait. Past Medical History:   Diagnosis Date    Herpes genitalia     Kidney stones     Migraines     UTI (urinary tract infection)     Vitamin D deficiency        Past Surgical History:   Procedure Laterality Date    HX  SECTION  2020       Current Outpatient Medications   Medication Sig Dispense Refill    melatonin 5 mg cap capsule Take  by mouth nightly.  amitriptyline (ELAVIL) 75 mg tablet Take 1 Tablet by mouth nightly as needed for Sleep. Indications: migraine prevention 90 Tablet 1    butalbital-acetaminophen-caffeine (FIORICET, ESGIC) -40 mg per tablet Take 1 tablet every 4 hours as needed for migraine. Not to exceed 6 tablets per day. 30 Tab 0    ondansetron (ZOFRAN ODT) 4 mg disintegrating tablet DIS 1 T ON THE TONGUE Q 8 H PRF NAUSEA OR VOM      cholecalciferol, vitamin D3, 50 mcg (2,000 unit) tab Take  by mouth.  ibuprofen (MOTRIN) 800 mg tablet Take  by mouth.  lasmiditan (Reyvow) 100 mg tab Take 1 Tablet by mouth daily as needed for PRN Reason (Other) (migraine).  PRN/1 DOSE PER 24 HOURS Indications: a migraine headache (Patient not taking: Reported on 11/23/2021) 8 Each 1        Allergies   Allergen Reactions    Aspirin Other (comments)       Social History     Tobacco Use    Smoking status: Never Smoker    Smokeless tobacco: Never Used   Vaping Use    Vaping Use: Never used   Substance Use Topics    Alcohol use: Yes     Comment: on ocassion    Drug use: Never       Family History   Adopted: Yes   Problem Relation Age of Onset    No Known Problems Mother     No Known Problems Father     Hypertension Sister     Heart Disease Sister        Review of Systems  Pertinent positives and negatives as noted. Examination  Visit Vitals  /69 (BP 1 Location: Left upper arm, BP Patient Position: Sitting)   Pulse 97   Resp 16   Ht 5' 4\" (1.626 m)   Wt 58.5 kg (129 lb)   LMP 11/01/2021   SpO2 98%   BMI 22.14 kg/m²     Neurologically, she is awake, alert, and oriented with normal speech and language. Her cognition is normal.    Intact cranial nerves 2-12. No nystagmus. Disk margins are flat bilaterally. She has normal bulk and tone. She has no abnormal movement. She has no pronation or drift. She generates full strength in the upper and lower extremities to direct confrontational testing. Reflexes are symmetrical in the upper and lower extremities bilaterally. No pathologic reflexes are elicited. Finger nose finger and rapid alternating movements are normal.  Steady gait.       Impression/Plan  65-year-old lady with intractable chronic migraine headaches  We discussed migraine pathophysiology specifically CGRP and more modern treatments  Start Ajovy in a customary fashion and discussed potential side effect which is only redness at the injection site  Discussed her use of Nurtec which actually was an excellent response and I have no idea why she would get irritable with Nurtec as that is not a reported side effect and she had an excellent response and she also indicates it would be 3 days before she would get another migraine so again excellent response  Given this we will use Nurtec as an abortive agent for her  Continue Elavil for now  Follow-up in 3 months    Guru Keating MD          This note was created using voice recognition software. Despite editing, there may be syntax errors.

## 2021-11-23 NOTE — TELEPHONE ENCOUNTER
----- Message from Nando Gallo sent at 11/23/2021  2:55 PM EST -----  Regarding: /telephone  General Message/Vendor Calls    Caller's first and last name:self       Reason for call: pre-authorization medications       Callback required yes/no and why:yes to speak with Dr. Vincent Mathur contact number(s):[phone]        Details to clarify the request:Pt called to speak with Dr. Ronny Ha, regarding on faxing over a pre- authorization medications of \"fremanezumab-vfrm (Ajovy Autoinjector) 225 mg/1.5 mL auto-injector\" and \"rimegepant (Nurtec ODT) 75 mg disintegrating tablet\" to the pt's pharmacy.               Nando Gallo

## 2021-11-23 NOTE — PROGRESS NOTES
Chief Complaint   [facility]   [facility]     [facility]// Migraines- referred by PCP office // Saw Neuro 5 yrs ago @ [facility] Migraine     Elavil 75 mg nightly and Fioricet PRN// Unable to fill new [place] // Had S.E. on Nurtec // Daily HA's, migraines // [place] Oct 15th ER

## 2021-11-29 NOTE — TELEPHONE ENCOUNTER
PA initiated through Cover my meds key # [id number] denied, must have tried/failed either aimovig or emgality    emgality sent as alternative per VO Dr. Jas Stinson. S/w pt and discussed instructions.

## 2021-11-29 NOTE — TELEPHONE ENCOUNTER
[facility]. Has significant HA on one side. Gave number to [facility] and forwarded msg to Dr. Kathleen Avila about taking alternative.

## 2021-12-07 ENCOUNTER — TELEPHONE (OUTPATIENT)
Dept: NEUROLOGY | Age: 31
End: 2021-12-07

## 2021-12-07 NOTE — TELEPHONE ENCOUNTER
Pt was seen once on 11/23/21. Notes from this visit was sent to insurance during PA initiation. Ajovy was denied stating \"some of following are lacking. .if using concomitantly with botox for migraine prophylaxis, if using the requested medication for prophylaxis of migraine HAs if there was a trial of and inadequate response to a 2 mo trial\"    All of needed information was contained in progress note including Ajovy was being initiated. Nowhere in note does it give any indication that pt is currently taking botox and note states she is starting Ajovy. This was indicated in PA questions as well. Not sure why this was denied unless notes were never read or misinterpreted. Called [employer] and reinitiated PA. Ref # [id number] turnaround time of 5 days. S/w pt, notified Miko Fredericklesia would not cause prolonged menses. She will sched appt with GYN to be evaluated. Advised pt that if ajovy is denied again, she may use copay card.

## 2021-12-07 NOTE — TELEPHONE ENCOUNTER
Patient is calling in stating that she has had her menstrual cycle since the day of her Ajovy injection on 11/23. This is not a normal occurrence for her. It has been extremely heavy the entire time. She wants to know if the injection could have caused this. Also, patient was notified by her insurance company that [address] were denied due \"lack of information. \"

## 2021-12-10 RX ORDER — GALCANEZUMAB 120 MG/ML
120 INJECTION, SOLUTION SUBCUTANEOUS
Qty: 1 ML | Refills: 3 | Status: SHIPPED | OUTPATIENT
Start: 2021-12-31 | End: 2022-01-26

## 2021-12-10 RX ORDER — GALCANEZUMAB 120 MG/ML
240 INJECTION, SOLUTION SUBCUTANEOUS ONCE
Qty: 2 ML | Refills: 0 | Status: SHIPPED | OUTPATIENT
Start: 2021-12-10 | End: 2021-12-10

## 2021-12-16 ENCOUNTER — NURSE TRIAGE (OUTPATIENT)
Dept: OTHER | Facility: CLINIC | Age: 31
End: 2021-12-16

## 2021-12-16 NOTE — TELEPHONE ENCOUNTER
Received call from Joceline at [facility] with Red Flag Complaint. Brief description of triage:    26 y/o female with a constant right flank pain since she woke up today. Pt reports a foul odor with her urine. She states she started having symptoms after visiting neurology and getting an injection  On 11/23 pt reports she has had an irregular menstrual cycle that just ended 12/11  The injection she has was on the right side adjacent to her umbilicus region   The pain comes every 3-4 days and she feels very fatigued   Pt has urinated one time today     Pt had an Ajovy Injection for  migraines       Triage indicates for patient to:  seen in office today     Care advice provided, patient verbalizes understanding; denies any other questions or concerns; instructed to call back for any new or worsening symptoms. Writer provided warm transfer to Alysia Morris  at [facility] for appointment scheduling. Attention Provider: Thank you for allowing me to participate in the care of your patient. The patient was connected to triage in response to information provided to the ECC. Please do not respond through this encounter as the response is not directed to a shared pool. Reason for Disposition   MODERATE pain (e.g., interferes with normal activities or awakens from sleep)    Answer Assessment - Initial Assessment Questions  1. LOCATION: \"Where does it hurt? \" (e.g., left, right)      Right flank pain     2. ONSET: \"When did the pain start?\"      12/11  The pain was intermittent     3. SEVERITY: \"How bad is the pain? \" (e.g., Scale 1-10; mild, moderate, or severe)    - MILD (1-3): doesn't interfere with normal activities     - MODERATE (4-7): interferes with normal activities or awakens from sleep     - SEVERE (8-10): excruciating pain and patient unable to do normal activities (stays in bed)       Moderate pain  Pt reports waking up from sleep at times     4.  PATTERN: \"Does the pain come and go, or is it constant? \"      The  Pain has been constant today     5. CAUSE: \"What do you think is causing the pain? \"        Pt had a new injection for migraines Ajovy    6. OTHER SYMPTOMS:  \"Do you have any other symptoms? \" (e.g., fever, abdominal pain, vomiting, leg weakness, burning with urination, blood in urine)    Strong odor from urine     7. PREGNANCY:  \"Is there any chance you are pregnant? \" \"When was your last menstrual period? \"  12/11 just ended  An irregular cycle    Protocols used:  FLANK PAIN-ADULT-[place]

## 2021-12-17 ENCOUNTER — TELEPHONE (OUTPATIENT)
Dept: FAMILY MEDICINE CLINIC | Age: 31
End: 2021-12-17

## 2021-12-17 NOTE — TELEPHONE ENCOUNTER
I attempted to contact patient to check to see if she still needed to be seen or if she went to urgent care, but I had to leave a m  essage to call the office. Received  call from [facility] with Red Flag Complaint.     Brief description of triage:    26 y/o female with a constant right flank pain since she woke up today. Pt reports a foul odor with her urine.   She states she started having symptoms after visiting neurology and getting an injection  On 11/23 pt reports she has had an irregular menstrual cycle that just ended 12/11  The injection she has was on the right side adjacent to her umbilicus region   The pain comes every 3-4 days and she feels very fatigued   Pt has urinated one time today      Pt had an Ajovy Injection for  migraines         Triage indicates for patient to:  seen in office today

## 2022-01-26 ENCOUNTER — OFFICE VISIT (OUTPATIENT)
Dept: FAMILY MEDICINE CLINIC | Age: 32
End: 2022-01-26

## 2022-01-26 VITALS
HEIGHT: 64 IN | TEMPERATURE: 98.3 F | OXYGEN SATURATION: 99 % | RESPIRATION RATE: 16 BRPM | HEART RATE: 106 BPM | BODY MASS INDEX: 21.68 KG/M2 | DIASTOLIC BLOOD PRESSURE: 89 MMHG | SYSTOLIC BLOOD PRESSURE: 116 MMHG | WEIGHT: 127 LBS

## 2022-01-26 DIAGNOSIS — R45.89 TEARFULNESS: ICD-10-CM

## 2022-01-26 DIAGNOSIS — G43.901 MIGRAINE WITH STATUS MIGRAINOSUS, NOT INTRACTABLE, UNSPECIFIED MIGRAINE TYPE: ICD-10-CM

## 2022-01-26 DIAGNOSIS — Z02.71 ISSUE OF MEDICAL CERTIFICATE FOR DISABILITY EXAMINATION: Primary | ICD-10-CM

## 2022-01-26 DIAGNOSIS — Z62.810 PERSONAL HISTORY OF PHYSICAL AND SEXUAL ABUSE IN CHILDHOOD: ICD-10-CM

## 2022-01-26 PROCEDURE — 99214 OFFICE O/P EST MOD 30 MIN: CPT | Performed by: NURSE PRACTITIONER

## 2022-01-26 RX ORDER — AMITRIPTYLINE HYDROCHLORIDE 75 MG/1
75 TABLET, FILM COATED ORAL
Qty: 90 TABLET | Refills: 2 | Status: SHIPPED | OUTPATIENT
Start: 2022-01-26 | End: 2022-01-31

## 2022-01-26 NOTE — PROGRESS NOTES
Chief Complaint   Patient presents with    Follow-up     Visit Vitals  /89 (BP 1 Location: Left upper arm, BP Patient Position: Sitting)   Pulse (!) 106   Temp 98.3 °F (36.8 °C) (Axillary)   Resp 16   Ht 5' 4\" (1.626 m)   Wt 127 lb (57.6 kg)   SpO2 99%   BMI 21.80 kg/m²     1. Have you been to the ER, urgent care clinic since your last visit? Hospitalized since your last visit? No    2. Have you seen or consulted any other health care providers outside of the [address] [place] [address] since your last visit? Include any pap smears or colon screening.  No
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
## 2022-01-26 NOTE — Clinical Note
I need pt to see me next week, can you review this questionaire with pt either she can complete over the phone, you can input answers into flowsheets on epic or provide the website and she can print answers for me [email or web address]

## 2022-01-27 ENCOUNTER — TELEPHONE (OUTPATIENT)
Dept: FAMILY MEDICINE CLINIC | Age: 32
End: 2022-01-27

## 2022-01-27 PROBLEM — Z62.810 PERSONAL HISTORY OF PHYSICAL AND SEXUAL ABUSE IN CHILDHOOD: Status: ACTIVE | Noted: 2022-01-27

## 2022-01-27 PROBLEM — G43.901 MIGRAINE WITH STATUS MIGRAINOSUS, NOT INTRACTABLE: Status: ACTIVE | Noted: 2022-01-27

## 2022-01-27 RX ORDER — PROPRANOLOL HYDROCHLORIDE 60 MG/1
60 CAPSULE, EXTENDED RELEASE ORAL DAILY
Qty: 90 CAPSULE | Refills: 2 | Status: SHIPPED | OUTPATIENT
Start: 2022-01-27 | End: 2022-05-12 | Stop reason: ALTCHOICE

## 2022-01-27 RX ORDER — PROPRANOLOL HYDROCHLORIDE 60 MG/1
60 CAPSULE, EXTENDED RELEASE ORAL DAILY
Qty: 90 CAPSULE | Refills: 2 | Status: SHIPPED | OUTPATIENT
Start: 2022-01-27 | End: 2022-01-27 | Stop reason: SDUPTHER

## 2022-01-27 NOTE — TELEPHONE ENCOUNTER
Please send medication to [facility]. Pt currently taking 75mg nightly of Amitriptyline,  she has 50 and 25mg pills on hand.

## 2022-01-27 NOTE — PATIENT INSTRUCTIONS
Bipolar Disorder: Care Instructions  Your Care Instructions     Bipolar disorder is an illness that causes extreme mood changes, from times of very high energy (manic episodes) to times of depression. But many people with bipolar disorder show only the symptoms of depression. These moods may cause problems with your work, school, family life, friendships, and how well you function. This disease is also called manic-depression. There is no cure for bipolar disorder, but it can be helped with medicines. Counseling may also help. It is important to take your medicines exactly as prescribed, even when you feel well. You may need lifelong treatment. Follow-up care is a key part of your treatment and safety. Be sure to make and go to all appointments, and call your doctor if you are having problems. It's also a good idea to know your test results and keep a list of the medicines you take. How can you care for yourself at home? · Be safe with medicines. Take your medicines exactly as prescribed. Do not stop or change a medicine without talking to your doctor first. Jaswant Williamson and your doctor may need to try different combinations of medicines to find what works for you. · Take your medicines on schedule to keep your moods even. When you feel good, you may think that you do not need your medicines. But it is important to keep taking them. · Go to your counseling sessions. Call and talk with your counselor if you can't go to a session or if you don't think the sessions are helping. Do not just stop going. · Get at least 30 minutes of activity on most days of the week. Walking is a good choice. You also may want to do other things, such as running, swimming, or cycling. · Get enough sleep. Keep your room dark and quiet. Try to go to bed at the same time every night. · Eat a healthy diet. This includes whole grains, dairy, fruits, vegetables, and protein. Eat foods from each of these groups.   · Try to lower your stress. Manage your time, build a strong support system, and lead a healthy lifestyle. To lower your stress, try physical activity, slow deep breathing, or getting a massage. · Do not use alcohol, marijuana, or illegal drugs. · Learn the early signs of your mood changes. You can then take steps to help yourself feel better. · Ask for help from friends and family when you need it. You may need help with daily chores when you are depressed. When you are manic, you may need support to control your high energy levels. What should you do if someone in your family has bipolar disorder? · Learn about the disease and signs it's getting worse. · Remind your family member you love them. · Make a plan with all family members about how to take care of your loved one when symptoms are bad. · Remind yourself it will take time for changes to occur. · Try not to blame yourself for the disease. · Know your legal rights and the legal rights of your family member. Support groups or counselors can help with this information. · Take care of yourself. Keep up with your interests, such as career, hobbies, and friends. Use exercise, positive self-talk, deep breathing, and other relaxing exercises to help lower your stress. · Give yourself time to grieve. You may need to deal with emotions such as anger, fear, and frustration. · If you are having a hard time with your feelings or with your relationship with your family member, talk with a counselor. When should you call for help? Call 911 anytime you think you may need emergency care. For example, call if:    · You feel like hurting yourself or someone else.     · Someone who has bipolar disorder displays dangerous behavior, and you think the person might hurt himself or herself or someone else. Call your doctor now or seek immediate medical care if:    · You hear voices.     · Someone you know has bipolar disorder and talks about suicide.  Keep the numbers for these national suicide hotlines: 9-248-719-TALK (7-300.478.6319) and 5-471-QPORDRW (6-607.554.7193). If a suicide threat seems real, with a specific plan and a way to carry it out, stay with the person, or ask someone you trust to stay with the person, until you can get help.     · Someone you know has bipolar disorder and:  ? Starts to give away possessions. ? Is using illegal drugs or drinking alcohol heavily. ? Talks or writes about death, including writing suicide notes or talking about guns, knives, or pills. ? Talks or writes about hurting someone else. ? Starts to spend a lot of time alone. ? Acts very aggressively or suddenly appears calm. ? Talks about beliefs that are not based in reality (delusions). Watch closely for changes in your health, and be sure to contact your doctor if:    · You cannot go to your counseling sessions. Where can you learn more? Go to http://www.lopez.com/  Enter K052 in the search box to learn more about \"Bipolar Disorder: Care Instructions. \"  Current as of: June 16, 2021               Content Version: 13.0  © 5734-0292 [employer]. Care instructions adapted under license by [employer] (which disclaims liability or warranty for this information). If you have questions about a medical condition or this instruction, always ask your healthcare professional. [place] [address] any warranty or liability for your use of this information.

## 2022-01-27 NOTE — TELEPHONE ENCOUNTER
Per DNK: I need pt to see me next week, can you review this questionaire with pt either she can complete over the phone, you can input answers into flowsheets on epic or provide the website and she can print answers for me       Spoke to pt and did mood questionnaire with pt, see flow sheet. Pt is scheduled for 1/31/22 to discuss results further. MDQ 1/27/2022    you felt so good or so hyper that other people thought you were not your normal self or you were so hyper that you got into trouble? 1    you were so irritable that you shouted at people or started fights or arguments? 1    you felt much more self-confident than usual? 0    you got much less sleep than usual and found you didn't really miss it? 0    you were much more talkative or spoke faster than usual? 1    thoughts raced through your head or you couldn't slow your mind down? 1    you were so easily distracted by things around you that you had trouble concentrating or staying on track? 1    you had much more energy than usual? 1    you were much more active or did many more things than usual? 1    you were much more social or outgoing than usual, for example, you telephoned friends in the middle of the night?  1    you were much more interested in sex than usual? 0    you did things that were unusual for you or that other people might have thought were excessive, foolish, or risky? 0

## 2022-01-27 NOTE — TELEPHONE ENCOUNTER
She would like the new anxiety meds sent into [address] instead of [facility] because its cheaper plus she is confused about dosing of amitriptyline.  Please advise

## 2022-01-28 NOTE — TELEPHONE ENCOUNTER
Pt coming in on Monday. She picked up the Propranolol and was also given a script for 75mg Amitriptyline. Advised her to take the 50mg that she has on hand until she comes in a dn we can discuss the dosage change more when she comes in.

## 2022-01-31 ENCOUNTER — OFFICE VISIT (OUTPATIENT)
Dept: FAMILY MEDICINE CLINIC | Age: 32
End: 2022-01-31
Payer: COMMERCIAL

## 2022-01-31 VITALS
SYSTOLIC BLOOD PRESSURE: 120 MMHG | OXYGEN SATURATION: 97 % | BODY MASS INDEX: 21.85 KG/M2 | DIASTOLIC BLOOD PRESSURE: 76 MMHG | TEMPERATURE: 97.6 F | HEIGHT: 64 IN | HEART RATE: 76 BPM | WEIGHT: 128 LBS

## 2022-01-31 DIAGNOSIS — F39 MOOD DISORDER (HCC): Primary | ICD-10-CM

## 2022-01-31 DIAGNOSIS — R00.0 TACHYCARDIA WITH HEART RATE 100-120 BEATS PER MINUTE: ICD-10-CM

## 2022-01-31 DIAGNOSIS — Z13.31 POSITIVE DEPRESSION SCREENING: ICD-10-CM

## 2022-01-31 DIAGNOSIS — G43.501 PERSISTENT MIGRAINE AURA WITHOUT CEREBRAL INFARCTION AND WITH STATUS MIGRAINOSUS, NOT INTRACTABLE: ICD-10-CM

## 2022-01-31 DIAGNOSIS — Z11.59 ENCOUNTER FOR HEPATITIS C SCREENING TEST FOR LOW RISK PATIENT: ICD-10-CM

## 2022-01-31 PROCEDURE — 99214 OFFICE O/P EST MOD 30 MIN: CPT | Performed by: NURSE PRACTITIONER

## 2022-01-31 RX ORDER — BISMUTH SUBSALICYLATE 262 MG
1 TABLET,CHEWABLE ORAL DAILY
COMMUNITY

## 2022-01-31 RX ORDER — POLYDEXTROSE 1.5 G
1 TABLET,CHEWABLE ORAL DAILY
COMMUNITY

## 2022-01-31 RX ORDER — LANOLIN ALCOHOL/MO/W.PET/CERES
65 CREAM (GRAM) TOPICAL
COMMUNITY

## 2022-01-31 RX ORDER — GLUCOSAMINE/CHONDR SU A SOD 750-600 MG
TABLET ORAL
COMMUNITY
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
## 2022-01-31 NOTE — PROGRESS NOTES
Chief Complaint   Patient presents with    Follow-up     Discuss mood. 1. Have you been to the ER, urgent care clinic since your last visit? Hospitalized since your last visit? No    2. Have you seen or consulted any other health care providers outside of the [address] [place] [address] since your last visit? Include any pap smears or colon screening.  No     3 most recent PHQ Screens 1/31/2022   Little interest or pleasure in doing things Nearly every day   Feeling down, depressed, irritable, or hopeless Several days   Total Score PHQ 2 4   Trouble falling or staying asleep, or sleeping too much Several days   Feeling tired or having little energy More than half the days   Poor appetite, weight loss, or overeating Not at all   Feeling bad about yourself - or that you are a failure or have let yourself or your family down Nearly every day   Trouble concentrating on things such as school, work, reading, or watching TV Nearly every day   Moving or speaking so slowly that other people could have noticed; or the opposite being so fidgety that others notice Several days   Thoughts of being better off dead, or hurting yourself in some way Not at all   PHQ 9 Score 14   How difficult have these problems made it for you to do your work, take care of your home and get along with others Somewhat difficult

## 2022-02-01 NOTE — PATIENT INSTRUCTIONS
Beta-Blockers: Care Instructions  Your Care Instructions     Beta-blockers are used to lower blood pressure, treat heart failure and heart rhythm problems, and relieve angina symptoms, such as chest pain or pressure. And they decrease the chance of a second heart attack in someone who has already had a heart attack. They also slow the heart rate and reduce strain on the heart muscle and blood vessels. Most people do not have any side effects from beta-blockers. In rare cases, they can make asthma worse or make you feel tired. In some people, heart rate or blood pressure can drop too low. You may feel lightheaded. This may happen if you stand up quickly. It usually gets better with time. Before you start to take this medicine, make sure your doctor knows if you have severe asthma or frequent asthma attacks. Examples include:  · Atenolol (Tenormin). · Labetalol (Trandate). · Metoprolol (Lopressor, Toprol). · Propranolol (Inderal). Follow-up care is a key part of your treatment and safety. Be sure to make and go to all appointments, and call your doctor if you are having problems. It's also a good idea to know your test results and keep a list of the medicines you take. How can you care for yourself at home? · Take your medicines exactly as prescribed. Be sure to take high blood pressure medicines every day. Since high blood pressure often has no symptoms, it is easy to forget to take the pills. Call your doctor if you think you are having a problem with your medicine. · Always tell your doctor if you think you are having a side effect from your medicine. Stopping suddenly may make chest pains worse or cause your blood pressure to go up. Or it can cause other symptoms. If side effects are a problem with one medicine, you can try a different one. · Check with your doctor before you use any over-the-counter medicines. Beta-blockers can interact with other medicines.  Make sure your doctor knows all of the medicines, vitamins, herbal products, and supplements you take. · Some people feel tired when they take beta-blockers. If you exercise, you may tire more easily. If beta-blockers make it very hard for you to exercise, talk to your doctor. When should you call for help? Watch closely for changes in your health, and be sure to contact your doctor if:    · You have any problems with your medicine. Where can you learn more? Go to http://www.gray.com/  Enter Q208 in the search box to learn more about \"Beta-Blockers: Care Instructions. \"  Current as of: April 29, 2021               Content Version: 13.0  © 8504-5568 [employer]. Care instructions adapted under license by [employer] (which disclaims liability or warranty for this information). If you have questions about a medical condition or this instruction, always ask your healthcare professional. [address] any warranty or liability for your use of this information.

## 2022-02-02 LAB
ALBUMIN SERPL-MCNC: 4.8 G/DL (ref 3.8–4.8)
ALBUMIN/GLOB SERPL: 2 {RATIO} (ref 1.2–2.2)
ALP SERPL-CCNC: 73 IU/L (ref 44–121)
ALT SERPL-CCNC: 23 IU/L (ref 0–32)
AST SERPL-CCNC: 25 IU/L (ref 0–40)
BASOPHILS # BLD AUTO: 0 X10E3/UL (ref 0–0.2)
BASOPHILS NFR BLD AUTO: 0 %
BILIRUB SERPL-MCNC: 0.4 MG/DL (ref 0–1.2)
BUN SERPL-MCNC: 12 MG/DL (ref 6–20)
BUN/CREAT SERPL: 20 (ref 9–23)
CALCIUM SERPL-MCNC: 9.3 MG/DL (ref 8.7–10.2)
CHLORIDE SERPL-SCNC: 104 MMOL/L (ref 96–106)
CHOLEST SERPL-MCNC: 196 MG/DL (ref 100–199)
CO2 SERPL-SCNC: 26 MMOL/L (ref 20–29)
CREAT SERPL-MCNC: 0.59 MG/DL (ref 0.57–1)
EOSINOPHIL # BLD AUTO: 0.1 X10E3/UL (ref 0–0.4)
EOSINOPHIL NFR BLD AUTO: 2 %
ERYTHROCYTE [DISTWIDTH] IN BLOOD BY AUTOMATED COUNT: 12.9 % (ref 11.7–15.4)
GLOBULIN SER CALC-MCNC: 2.4 G/DL (ref 1.5–4.5)
GLUCOSE SERPL-MCNC: 85 MG/DL (ref 65–99)
HCT VFR BLD AUTO: 42.9 % (ref 34–46.6)
HCV AB S/CO SERPL IA: <0.1 S/CO RATIO (ref 0–0.9)
HCV AB SERPL QL IA: NORMAL
HDLC SERPL-MCNC: 68 MG/DL
HGB BLD-MCNC: 14.6 G/DL (ref 11.1–15.9)
IMM GRANULOCYTES # BLD AUTO: 0 X10E3/UL (ref 0–0.1)
IMM GRANULOCYTES NFR BLD AUTO: 0 %
LDLC SERPL CALC-MCNC: 108 MG/DL (ref 0–99)
LYMPHOCYTES # BLD AUTO: 2.3 X10E3/UL (ref 0.7–3.1)
LYMPHOCYTES NFR BLD AUTO: 34 %
MCH RBC QN AUTO: 29.9 PG (ref 26.6–33)
MCHC RBC AUTO-ENTMCNC: 34 G/DL (ref 31.5–35.7)
MCV RBC AUTO: 88 FL (ref 79–97)
MONOCYTES # BLD AUTO: 0.5 X10E3/UL (ref 0.1–0.9)
MONOCYTES NFR BLD AUTO: 7 %
NEUTROPHILS # BLD AUTO: 3.9 X10E3/UL (ref 1.4–7)
NEUTROPHILS NFR BLD AUTO: 57 %
PLATELET # BLD AUTO: 281 X10E3/UL (ref 150–450)
POTASSIUM SERPL-SCNC: 4.8 MMOL/L (ref 3.5–5.2)
PROT SERPL-MCNC: 7.2 G/DL (ref 6–8.5)
RBC # BLD AUTO: 4.89 X10E6/UL (ref 3.77–5.28)
SODIUM SERPL-SCNC: 141 MMOL/L (ref 134–144)
TRIGL SERPL-MCNC: 113 MG/DL (ref 0–149)
TSH SERPL DL<=0.005 MIU/L-ACNC: 1.75 UIU/ML (ref 0.45–4.5)
VLDLC SERPL CALC-MCNC: 20 MG/DL (ref 5–40)
WBC # BLD AUTO: 6.8 X10E3/UL (ref 3.4–10.8)

## 2022-02-21 ENCOUNTER — OFFICE VISIT (OUTPATIENT)
Dept: NEUROLOGY | Age: 32
End: 2022-02-21

## 2022-02-21 DIAGNOSIS — Z53.8 APPOINTMENT CANCELED BY HOSPITAL: Primary | ICD-10-CM

## 2022-03-17 ENCOUNTER — OFFICE VISIT (OUTPATIENT)
Dept: NEUROLOGY | Age: 32
End: 2022-03-17
Payer: COMMERCIAL

## 2022-03-17 DIAGNOSIS — G43.009 ATYPICAL MIGRAINE: Primary | ICD-10-CM

## 2022-03-17 DIAGNOSIS — G43.919 INTRACTABLE MIGRAINE WITHOUT STATUS MIGRAINOSUS, UNSPECIFIED MIGRAINE TYPE: ICD-10-CM

## 2022-03-17 DIAGNOSIS — R41.3 MEMORY LOSS: ICD-10-CM

## 2022-03-17 DIAGNOSIS — H53.9 VISUAL CHANGES: ICD-10-CM

## 2022-03-17 DIAGNOSIS — R20.0 FACIAL NUMBNESS: ICD-10-CM

## 2022-03-17 PROCEDURE — 99215 OFFICE O/P EST HI 40 MIN: CPT | Performed by: NURSE PRACTITIONER

## 2022-03-17 RX ORDER — AMITRIPTYLINE HYDROCHLORIDE 25 MG/1
25 TABLET, FILM COATED ORAL
Qty: 30 TABLET | Refills: 5 | Status: SHIPPED | OUTPATIENT
Start: 2022-03-17 | End: 2022-05-12 | Stop reason: ALTCHOICE

## 2022-03-17 RX ORDER — PROMETHAZINE HYDROCHLORIDE 12.5 MG/1
12.5 SUPPOSITORY RECTAL
Qty: 15 SUPPOSITORY | Refills: 1 | Status: SHIPPED | OUTPATIENT
Start: 2022-03-17 | End: 2022-05-12 | Stop reason: ALTCHOICE

## 2022-03-17 RX ORDER — AMITRIPTYLINE HYDROCHLORIDE 75 MG/1
37 TABLET, FILM COATED ORAL
COMMUNITY
End: 2022-03-17 | Stop reason: ALTCHOICE

## 2022-03-17 RX ORDER — CETIRIZINE HCL 10 MG
10 TABLET ORAL DAILY
COMMUNITY
End: 2022-07-29

## 2022-03-17 NOTE — PROGRESS NOTES
Jevon Wilkinson is a 34 y.o. female who presents with the following  Chief Complaint   Patient presents with    Migraine       HPI    FU for ER visit with new onset of complex migraine. She does come with CT head which reviewed   No MRI     She states she continues having chronic migraines. She has noticed they are getting more painful and different. She states she has a long hx of migraine and family migraine but this complex is a first one   Was at work and developed and felt she was having a stroke. She has taken Nurtec and this caused nausea, weight loss  Ajovy caused menstrual bleeding   Has failed multiple triptans in the [place]  She is currently on Elavil and weaning off   Inderal LA   And failed Topamax. She is very debilitated by these   She does have some heightened anxiety and feels like this makes things worse  She has not been able to calm her headaches down and this cycles the anxiety. She notices her memory is also not that good. Forgetful, expression issues, mood. She is looking at wanting to evaluate this further. Allergies   Allergen Reactions    Aspirin Other (comments)       Current Outpatient Medications   Medication Sig    COQ10, LIPOSOMAL UBIQUINOL, PO Take  by mouth daily.  cetirizine (ZyrTEC) 10 mg tablet Take 10 mg by mouth daily.  amitriptyline (ELAVIL) 25 mg tablet Take 1 Tablet by mouth nightly.  ubrogepant Gauthier Pérez) 100 mg tablet 1 at HA onset and repeat in 2 hours if needed. Max 2 in 24 hours BIN: [id number] PCN: [id number]  GRP: [id number] ID:  [id number]    promethazine (PHENERGAN) 12.5 mg suppository Insert 1 Suppository into rectum every six (6) hours as needed for Nausea.  Biotin 2,500 mcg cap Take  by mouth.  ferrous sulfate (Iron) 325 mg (65 mg iron) tablet Take 65 mg by mouth Daily (before breakfast).  multivitamin (ONE A DAY) tablet Take 1 Tablet by mouth daily.     multivitamin (Hair,Skin and Nails) tablet Take 1 Tablet by mouth daily.  propranolol LA (INDERAL LA) 60 mg SR capsule Take 1 Capsule by mouth daily.  rimegepant (Nurtec ODT) 75 mg disintegrating tablet 1 at migraine onset   Max 1 tab/24 hours    melatonin 5 mg cap capsule Take  by mouth nightly.  ondansetron (ZOFRAN ODT) 4 mg disintegrating tablet DIS 1 T ON THE TONGUE Q 8 H PRF NAUSEA OR VOM    cholecalciferol, vitamin D3, 50 mcg (2,000 unit) tab Take  by mouth.  ibuprofen (MOTRIN) 800 mg tablet Take  by mouth. No current facility-administered medications for this visit. Social History     Tobacco Use   Smoking Status Never Smoker   Smokeless Tobacco Never Used       Past Medical History:   Diagnosis Date    Herpes genitalia     Kidney stones     Migraines     UTI (urinary tract infection)     Vitamin D deficiency        Past Surgical History:   Procedure Laterality Date    HX  SECTION  2020       Family History   Adopted: Yes   Problem Relation Age of Onset    No Known Problems Mother     No Known Problems Father     Hypertension Sister     Heart Disease Sister        Social History     Socioeconomic History    Marital status:    Tobacco Use    Smoking status: Never Smoker    Smokeless tobacco: Never Used   Vaping Use    Vaping Use: Never used   Substance and Sexual Activity    Alcohol use: Yes     Comment: on ocassion    Drug use: Never    Sexual activity: Yes       Review of Systems   Eyes: Positive for blurred vision, double vision and photophobia. Respiratory: Negative for shortness of breath and wheezing. Cardiovascular: Negative for chest pain and palpitations. Gastrointestinal: Positive for nausea and vomiting. Neurological: Positive for dizziness, tingling, sensory change and headaches. Negative for seizures and loss of consciousness. Psychiatric/Behavioral: The patient is nervous/anxious. Remainder of comprehensive review is negative.      Physical Exam :    There were no vitals taken for this visit. General: Well defined, nourished, and groomed individual in no acute distress.    Neck: Supple, nontender, no bruits, no pain with resistance to active range of motion.    Musculoskeletal: Extremities revealed no edema and had full range of motion of joints.    Psych: Good mood and bright affect    NEUROLOGICAL EXAMINATION:    Mental Status: Alert and oriented to person, place, and time    Cranial Nerves:    II, III, IV, VI: Visual acuity grossly intact. Visual fields are normal.    Pupils are equal, round, and reactive to light and accommodation.    Extra-ocular movements are full and fluid. Fundoscopic exam was benign, no ptosis or nystagmus.    V-XII: Hearing is grossly intact. Facial features are symmetric, with normal sensation and strength. The palate rises symmetrically and the tongue protrudes midline. Sternocleidomastoids 5/5. Motor Examination: Normal tone, bulk, and strength, 5/5 muscle strength throughout. Coordination: Finger to nose was normal. No resting or intention tremor    Gait and Station: Steady while walking. Normal arm swing. No pronator drift. No muscle wasting or fasiculations noted. Reflexes: DTRs 2+ throughout.             Results for orders placed or performed in visit on 01/31/22   LIPID PANEL   Result Value Ref Range    Cholesterol, total 196 100 - 199 mg/dL    Triglyceride 113 0 - 149 mg/dL    HDL Cholesterol 68 >39 mg/dL    VLDL, calculated 20 5 - 40 mg/dL    LDL, calculated 108 (H) 0 - 99 mg/dL   METABOLIC PANEL, COMPREHENSIVE   Result Value Ref Range    Glucose 85 65 - 99 mg/dL    BUN 12 6 - 20 mg/dL    Creatinine 0.59 0.57 - 1.00 mg/dL    GFR est non- >59 mL/min/1.73    GFR est  >59 mL/min/1.73    BUN/Creatinine ratio 20 9 - 23    Sodium 141 134 - 144 mmol/L    Potassium 4.8 3.5 - 5.2 mmol/L    Chloride 104 96 - 106 mmol/L    CO2 26 20 - 29 mmol/L    Calcium 9.3 8.7 - 10.2 mg/dL    Protein, total 7.2 6.0 - 8.5 g/dL    Albumin 4.8 3.8 - 4.8 g/dL    GLOBULIN, TOTAL 2.4 1.5 - 4.5 g/dL    A-G Ratio 2.0 1.2 - 2.2    Bilirubin, total 0.4 0.0 - 1.2 mg/dL    Alk. phosphatase 73 44 - 121 IU/L    AST (SGOT) 25 0 - 40 IU/L    ALT (SGPT) 23 0 - 32 IU/L   CBC WITH AUTOMATED DIFF   Result Value Ref Range    WBC 6.8 3.4 - 10.8 x10E3/uL    RBC 4.89 3.77 - 5.28 x10E6/uL    HGB 14.6 11.1 - 15.9 g/dL    HCT 42.9 34.0 - 46.6 %    MCV 88 79 - 97 fL    MCH 29.9 26.6 - 33.0 pg    MCHC 34.0 31.5 - 35.7 g/dL    RDW 12.9 11.7 - 15.4 %    PLATELET 520 324 - 770 x10E3/uL    NEUTROPHILS 57 Not Estab. %    Lymphocytes 34 Not Estab. %    MONOCYTES 7 Not Estab. %    EOSINOPHILS 2 Not Estab. %    BASOPHILS 0 Not Estab. %    ABS. NEUTROPHILS 3.9 1.4 - 7.0 x10E3/uL    Abs Lymphocytes 2.3 0.7 - 3.1 x10E3/uL    ABS. MONOCYTES 0.5 0.1 - 0.9 x10E3/uL    ABS. EOSINOPHILS 0.1 0.0 - 0.4 x10E3/uL    ABS. BASOPHILS 0.0 0.0 - 0.2 x10E3/uL    IMMATURE GRANULOCYTES 0 Not Estab. %    ABS. IMM. GRANS. 0.0 0.0 - 0.1 x10E3/uL   THYROID CASCADE PROFILE   Result Value Ref Range    TSH 1.750 0.450 - 4.500 uIU/mL   HCV AB W/RFLX TO SAAD   Result Value Ref Range    HCV Ab <0.1 0.0 - 0.9 s/co ratio   HCV INTERPRETATION   Result Value Ref Range    HCV Interpretation Comment        Orders Placed This Encounter    MRI BRAIN W WO CONT     Standing Status:   Future     Standing Expiration Date:   4/17/2023     Order Specific Question:   Is Patient Pregnant? Answer:   No     Order Specific Question:   STAT Creatinine as indicated     Answer:   No    REFERRAL TO NEUROPSYCHOLOGY     Referral Priority:   Routine     Referral Type:   Consultation     Referral Reason:   Specialty Services Required     Referred to Provider:   Clarisa Hamm PsyD     Number of Visits Requested:   1    DUPLEX CAROTID BILATERAL     Standing Status:   Future     Number of Occurrences:   1     Standing Expiration Date:   9/17/2022     Order Specific Question:   Is Patient Pregnant?      Answer:   No    DISCONTD: amitriptyline (ELAVIL) 75 mg tablet     Sig: Take 37 mg by mouth nightly.  COQ10, LIPOSOMAL UBIQUINOL, PO     Sig: Take  by mouth daily.  cetirizine (ZyrTEC) 10 mg tablet     Sig: Take 10 mg by mouth daily.  amitriptyline (ELAVIL) 25 mg tablet     Sig: Take 1 Tablet by mouth nightly. Dispense:  30 Tablet     Refill:  5    ubrogepant (Ubrelvy) 100 mg tablet     Si at HA onset and repeat in 2 hours if needed. Max 2 in 24 hours BIN: [id number] PCN: [id number]  GRP: [id number] ID:  [id number]     Dispense:  48 Tablet     Refill:  1    promethazine (PHENERGAN) 12.5 mg suppository     Sig: Insert 1 Suppository into rectum every six (6) hours as needed for Nausea. Dispense:  15 Suppository     Refill:  1       1. Atypical migraine    2. Visual changes    3. Facial numbness    4. Intractable migraine without status migrainosus, unspecified migraine type    5. Memory loss        Discussed POC above. MRI brain to rule out stroke, lesion, mass, ischemia. Doppler looking at stenosis and flow. Will call with these results   Reviewed CT from [address] [place] [address]   Get notes. Failed Ajovy due to side effects and potential bleeding. Will avoid other injectables. Per report overseas has tried multiple triptans which make her feel bad. Also failed Topamax. On Inderal, Elavil (weaning off- drop to 25)   Nurtec made her feel anorexic in sorts    Try Ubrelvy for PRN   With complex migraine no vasoactivity indicated. Phenergan suppositories.      Considering BOTOX vs. Ed Sprang for prevention   Will call   Refer to neuropsychiatry for mood vs memory vs attention             This note will not be viewable in [email or web address]

## 2022-03-18 PROBLEM — G43.901 MIGRAINE WITH STATUS MIGRAINOSUS, NOT INTRACTABLE: Status: ACTIVE | Noted: 2022-01-27

## 2022-03-19 PROBLEM — Z62.810 PERSONAL HISTORY OF PHYSICAL AND SEXUAL ABUSE IN CHILDHOOD: Status: ACTIVE | Noted: 2022-01-27

## 2022-04-14 ENCOUNTER — HOSPITAL ENCOUNTER (OUTPATIENT)
Dept: MRI IMAGING | Age: 32
Discharge: HOME | End: 2022-04-14
Attending: NURSE PRACTITIONER
Payer: COMMERCIAL

## 2022-04-14 VITALS — BODY MASS INDEX: 21.97 KG/M2 | WEIGHT: 128 LBS

## 2022-04-14 DIAGNOSIS — H53.9 VISUAL CHANGES: ICD-10-CM

## 2022-04-14 DIAGNOSIS — G43.009 ATYPICAL MIGRAINE: ICD-10-CM

## 2022-04-14 DIAGNOSIS — R20.0 FACIAL NUMBNESS: ICD-10-CM

## 2022-04-14 PROCEDURE — 70553 MRI BRAIN STEM W/O & W/DYE: CPT

## 2022-04-14 PROCEDURE — 74011250636 HC RX REV CODE- 250/636: Performed by: RADIOLOGY

## 2022-04-14 PROCEDURE — A9575 INJ GADOTERATE MEGLUMI 0.1ML: HCPCS | Performed by: RADIOLOGY

## 2022-04-14 RX ORDER — GADOTERATE MEGLUMINE 376.9 MG/ML
11 INJECTION INTRAVENOUS
Status: COMPLETED | OUTPATIENT
Start: 2022-04-14 | End: 2022-04-14

## 2022-04-14 RX ADMIN — GADOTERATE MEGLUMINE 11 ML: 376.9 INJECTION INTRAVENOUS at 18:20

## 2022-04-15 ENCOUNTER — TELEPHONE (OUTPATIENT)
Dept: NEUROLOGY | Age: 32
End: 2022-04-15

## 2022-05-04 NOTE — TELEPHONE ENCOUNTER
Re: Rani OREILLY request from [address] [place] [address]  via fax.  Initiated in [facility], JZQ#[id number]

## 2022-05-05 NOTE — TELEPHONE ENCOUNTER
Re: Victor Manuel OREILLY approval via [facility]. [place] Case: [id number]  Approved: 5/5/2022 -5/5/2023    Sent pt mychart msg and notified [facility] speciality.

## 2022-05-12 ENCOUNTER — OFFICE VISIT (OUTPATIENT)
Dept: FAMILY MEDICINE CLINIC | Age: 32
End: 2022-05-12
Payer: COMMERCIAL

## 2022-05-12 VITALS
DIASTOLIC BLOOD PRESSURE: 60 MMHG | BODY MASS INDEX: 22.88 KG/M2 | OXYGEN SATURATION: 99 % | SYSTOLIC BLOOD PRESSURE: 100 MMHG | RESPIRATION RATE: 16 BRPM | HEART RATE: 66 BPM | HEIGHT: 64 IN | WEIGHT: 134 LBS | TEMPERATURE: 97.2 F

## 2022-05-12 DIAGNOSIS — E55.9 VITAMIN D DEFICIENCY: ICD-10-CM

## 2022-05-12 DIAGNOSIS — Z00.00 ENCOUNTER FOR WELLNESS EXAMINATION IN ADULT: ICD-10-CM

## 2022-05-12 DIAGNOSIS — G43.501 PERSISTENT MIGRAINE AURA WITHOUT CEREBRAL INFARCTION AND WITH STATUS MIGRAINOSUS, NOT INTRACTABLE: ICD-10-CM

## 2022-05-12 DIAGNOSIS — G43.101 COMPLICATED MIGRAINE WITH STATUS MIGRAINOSUS: Primary | ICD-10-CM

## 2022-05-12 PROCEDURE — 99214 OFFICE O/P EST MOD 30 MIN: CPT | Performed by: NURSE PRACTITIONER

## 2022-05-12 RX ORDER — PROPRANOLOL HYDROCHLORIDE 60 MG/1
60 TABLET ORAL EVERY OTHER DAY
Qty: 30 TABLET | Refills: 1 | Status: SHIPPED | OUTPATIENT
Start: 2022-05-12 | End: 2022-08-16

## 2022-05-12 NOTE — PROGRESS NOTES
Chief Complaint   Patient presents with    Follow Up Chronic Condition     DNK pt/     1. Have you been to the ER, urgent care clinic since your last visit? Hospitalized since your last visit? No    2. Have you seen or consulted any other health care providers outside of the [address] [place] [address] since your last visit? Include any pap smears or colon screening.  No   Visit Vitals  /60 (BP 1 Location: Left upper arm, BP Patient Position: Sitting, BP Cuff Size: Adult)   Pulse 66   Temp 97.2 °F (36.2 °C) (Temporal)   Resp 16   Ht 5' 4\" (1.626 m)   Wt 134 lb (60.8 kg)   SpO2 99%   BMI 23.00 kg/m²

## 2022-05-18 ENCOUNTER — TELEPHONE (OUTPATIENT)
Dept: NEUROLOGY | Age: 32
End: 2022-05-18

## 2022-05-20 PROBLEM — Z00.00 ENCOUNTER FOR WELLNESS EXAMINATION IN ADULT: Status: ACTIVE | Noted: 2022-05-20

## 2022-05-20 NOTE — PROGRESS NOTES
Subjective  Chief Complaint   Patient presents with    Annual Wellness Visit    Complete Physical     HPI:  Gonzalez Farmer is a 34 y.o. female. 35 yo female presents for annual wellness with physical. She has had labs in February of this year. Her pap was done 3 months ago by [facility] and it was WNL. She continues to have issues with migraines and has taken herself off all of her meds except her propranolol and is looking to wean off of that. She did not feel that any of the migraine meds were truly helping and she thought that if she stopped all her meds she could tell what is really working. She has gone from daily migraines to about 3 a week. She credits this with the fact that she has started going to the gym daily and improving her mental health.   Reviewed labs of Reshma Cano NP      Past Medical History:   Diagnosis Date    Herpes genitalia     Kidney stones     Migraines     UTI (urinary tract infection)     Vitamin D deficiency      Family History   Adopted: Yes   Problem Relation Age of Onset    No Known Problems Mother     No Known Problems Father     Hypertension Sister     Heart Disease Sister      Social History     Socioeconomic History    Marital status:      Spouse name: Not on file    Number of children: Not on file    Years of education: Not on file    Highest education level: Not on file   Occupational History    Not on file   Tobacco Use    Smoking status: Never Smoker    Smokeless tobacco: Never Used   Vaping Use    Vaping Use: Never used   Substance and Sexual Activity    Alcohol use: Yes     Comment: on ocassion    Drug use: Never    Sexual activity: Yes   Other Topics Concern    Not on file   Social History Narrative    Not on file     Social Determinants of Health     Financial Resource Strain:     Difficulty of Paying Living Expenses: Not on file   Food Insecurity:     Worried About Running Out of Food in the Last Year: Not on file    [address] in the Last Year: Not on file   Transportation Needs:     Lack of Transportation (Medical): Not on file    Lack of Transportation (Non-Medical): Not on file   Physical Activity:     Days of Exercise per Week: Not on file    Minutes of Exercise per Session: Not on file   Stress:     Feeling of Stress : Not on file   Social Connections:     Frequency of Communication with Friends and Family: Not on file    Frequency of Social Gatherings with Friends and Family: Not on file    Attends [employer] Services: Not on file    Active Member of [address] [place] [address] [employer] or Organizations: Not on file    Attends Club or Organization Meetings: Not on file    Marital Status: Not on file   Intimate Partner Violence:     Fear of Current or Ex-Partner: Not on file    Emotionally Abused: Not on file    Physically Abused: Not on file    Sexually Abused: Not on file   Housing Stability:     Unable to Pay for Housing in the Last Year: Not on file    Number of [place] in the Last Year: Not on file    Unstable Housing in the Last Year: Not on file     Current Outpatient Medications on File Prior to Visit   Medication Sig Dispense Refill    COQ10, LIPOSOMAL UBIQUINOL, PO Take  by mouth daily.  cetirizine (ZyrTEC) 10 mg tablet Take 10 mg by mouth daily.  Biotin 2,500 mcg cap Take  by mouth.  ferrous sulfate (Iron) 325 mg (65 mg iron) tablet Take 65 mg by mouth Daily (before breakfast).  multivitamin (ONE A DAY) tablet Take 1 Tablet by mouth daily.  multivitamin (Hair,Skin and Nails) tablet Take 1 Tablet by mouth daily.  melatonin 5 mg cap capsule Take  by mouth nightly.  cholecalciferol, vitamin D3, 50 mcg (2,000 unit) tab Take  by mouth.  ibuprofen (MOTRIN) 800 mg tablet Take  by mouth. No current facility-administered medications on file prior to visit.      Allergies   Allergen Reactions    Aspirin Other (comments)     ROS   ROS per HPI and PMH      Objective  Physical Exam  Vitals and nursing note reviewed. HENT:      Head: Normocephalic. Cardiovascular:      Rate and Rhythm: Normal rate and regular rhythm. Heart sounds: Normal heart sounds. Pulmonary:      Effort: Pulmonary effort is normal.      Breath sounds: Normal breath sounds. Abdominal:      General: Bowel sounds are normal.      Palpations: Abdomen is soft. Skin:     General: Skin is warm and dry. Neurological:      Mental Status: She is alert and oriented to person, place, and time. Psychiatric:         Mood and Affect: Mood normal.         Behavior: Behavior normal.         Thought Content: Thought content normal.         Judgment: Judgment normal.          Assessment & Plan      ICD-10-CM ICD-9-CM    1. Complicated migraine with status migrainosus  G43. 101 346.02    2. Persistent migraine aura without cerebral infarction and with status migrainosus, not intractable  G43.501 346.52    3. Vitamin D deficiency  E55.9 268.9    4. Encounter for wellness examination in adult  Z00.00 V70.0      Diagnoses and all orders for this visit:    1. Complicated migraine with status    aura without cerebral infarction and with status migrainosus, not intractable  Patient has found that exercise is decreasing the frequency of her headaches. Will continue with patients plan    3. Vitamin D deficiency  Will check at her next set of labs    4. Encounter for wellness examination in adult  We are making sure that her health screenings are being performed in a timely fashion. They are as documented in the EMR and HPI      Follow-up and Dispositions    · Return in about 6 months (around 11/12/2022) for f/u of chronic conditions with fasting labs.        Roberto Zamorano NP

## 2022-06-10 ENCOUNTER — NURSE TRIAGE (OUTPATIENT)
Dept: OTHER | Facility: CLINIC | Age: 32
End: 2022-06-10

## 2022-06-10 ENCOUNTER — TELEPHONE (OUTPATIENT)
Dept: FAMILY MEDICINE CLINIC | Age: 32
End: 2022-06-10

## 2022-06-10 NOTE — TELEPHONE ENCOUNTER
Received call from Earl at [facility] with Red Flag Complaint. Subjective: Caller states \" I been trying to get out off of the prednisone. I talked to the doc. I went every 5 days. I take a deep breath and my heart wants to come out of my mouth. My apple watch says 116 and 118, too high. I went to work today, had a migraine last night. I had a HR doc and they said they were moving me to another assignment and my  and /84 and O2 was 98%. My chest just doesn't feel right on the L side. I felt like I needed to burp or vomit and I was trying to make myself burp and cant. Right now my HR is 101 just sitting here and my head is hurting. I am a box of so many emotions. I was put on the medicine for anxiety and migraines. Since taking it my HR has been so high. 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 I do have a good support system. I am close to my house. My last doctor was very worried about my emotional stability. With all this COVID everything has changed. They have you doing this assignment for 3 yrs and then they suddenly change you and what residents I am taking care of that I dont know. It is so much. It is so mentally draining. I think about my kids and that Im going to work 8hrs and go home to my babies and that works sometimes. I need to find a new career. I try not to cry but sometimes I cry over anything. \"     Current Symptoms: Tachy, increased BP, anxiety- pt feels it is due to med, chest feels sore a little and in left arm- denies later in conversation , SOB- hard to take a deep breath currently     Onset: over all symptoms  has been going on for a month, but not as bad as today.  This episode started around 7am.     Associated Symptoms: reduced appetite    Pain Severity: 3/10; feels sore ; constant- denies currently while talking to writer     Temperature: denies     What has been tried: changed med dose to once every 5 days on her own     LMP: started on 5/27, ended on 6/3 Pregnant: No    Recommended disposition: Go to ED Now- Pt is declining to go and would like to speak to office. Care advice provided, patient verbalizes understanding; denies any other questions or concerns; instructed to call back for any new or worsening symptoms. Writer provided warm transfer to [facility] for further recommendation. Attention Provider: Thank you for allowing me to participate in the care of your patient. The patient was connected to triage in response to information provided to the ECC. Please do not respond through this encounter as the response is not directed to a shared pool.       Reason for Disposition   Difficulty breathing and persists > 10 minutes and not relieved by reassurance provided by triager    Protocols used: ANXIETY AND PANIC ATTACK-ADULT-[place]

## 2022-06-10 NOTE — TELEPHONE ENCOUNTER
[facility],[address] [facility] Nurse triage transferred patient to us. Patient states that she went to work and her assignment was changed and her BP is 130/84 and  she has a headache, chest pain , and tightness in shoulders and left arm, she states she doesn't know if it is her anxiety or her migraine. anxiety med propranolol that she takes every 5 days. Patient states that she doesn't feel well at all.  Patient advised to go to urgent care or ER and patient understands and states that she will go and be evaluated

## 2022-06-13 ENCOUNTER — TELEPHONE (OUTPATIENT)
Dept: FAMILY MEDICINE CLINIC | Age: 32
End: 2022-06-13

## 2022-06-13 NOTE — TELEPHONE ENCOUNTER
----- Message from Haleigh Medina sent at 6/13/2022  9:35 AM EDT -----  Subject: Appointment Request    Reason for Call: Routine ED Follow Up Visit    QUESTIONS  Type of Appointment? Established Patient  Reason for appointment request? Available appointments did not meet   patient need  Additional Information for Provider? Pt needs to follow up from the Urgent   Care, issues with bp medication and anxiety on 6/10, [facility], Aida Patel  ---------------------------------------------------------------------------  --------------  [address]  What is the best way for the office to contact you? OK to leave message on   voicemail  Preferred Call Back Phone Number? [phone]  ---------------------------------------------------------------------------  --------------  SCRIPT ANSWERS  Relationship to Patient? Self  (Patient requests to see provider urgently. )? No  Do you have any questions for your primary care provider that need to be   answered prior to your appointment? No  Have you been diagnosed with COVID-19 in the past 10 days? No  (Service Expert  click yes below to proceed with [employer] As Usual   Scheduling)?  Yes

## 2022-06-19 PROBLEM — Z00.00 ENCOUNTER FOR WELLNESS EXAMINATION IN ADULT: Status: RESOLVED | Noted: 2022-05-20 | Resolved: 2022-06-19

## 2022-07-12 ENCOUNTER — TELEPHONE (OUTPATIENT)
Dept: FAMILY MEDICINE CLINIC | Age: 32
End: 2022-07-12

## 2022-07-12 NOTE — TELEPHONE ENCOUNTER
Jerardo called and stated that Rosy Mathis is showing up in there system as non par with this patients insurance plan . She said that there is a Provider Maintenance form that can be filled out and that the provider services # is [phone] .

## 2022-07-29 ENCOUNTER — VIRTUAL VISIT (OUTPATIENT)
Dept: FAMILY MEDICINE CLINIC | Age: 32
End: 2022-07-29
Payer: COMMERCIAL

## 2022-07-29 ENCOUNTER — PATIENT MESSAGE (OUTPATIENT)
Dept: FAMILY MEDICINE CLINIC | Age: 32
End: 2022-07-29

## 2022-07-29 DIAGNOSIS — R00.0 TACHYCARDIA: Primary | ICD-10-CM

## 2022-07-29 PROCEDURE — 99214 OFFICE O/P EST MOD 30 MIN: CPT | Performed by: NURSE PRACTITIONER

## 2022-07-29 NOTE — PROGRESS NOTES
Diomedes Evans (: 1990) is a 34 y.o. female, established patient, here for evaluation of the following chief complaint(s):   Medication Evaluation (Bp meds are too high pt wants dosage dropped.)       ASSESSMENT/PLAN:  Below is the assessment and plan developed based on review of pertinent history, labs, studies, and medications. 1. Tachycardia  -     REFERRAL TO CARDIOLOGY; Future  Referral to cardiology for further evaluation and treatment           SUBJECTIVE/OBJECTIVE:  33 yo female presents after a visit to an urgent care after having an episode of tachycardia with a heart rate in the s accompanied by nausea and prior to that visit patient had stopped her beta blocker cold turkey and was told at her  visit that she should not have done that. Patient restarted her medication at  [facility] but her concern was that her blood pressures had been running low. She will monitor her blood pressure and if she perceives it to be too low she will decrease to 30 mgs. She will send pressure by my chart on Friday and if patient has not decreased herself to 30 mgs based on her bp readings I will make medication adjustment    Review of Systems   ROS per HPI and PMH  Patient-Reported Systolic (Top): 585  Patient-Reported Diastolic (Bottom): 63  Patient-Reported Pulse: 77       Physical Exam  HENT:      Head: Normocephalic. Neurological:      Mental Status: She is alert and oriented to person, place, and time. Psychiatric:         Mood and Affect: Mood normal.         Behavior: Behavior normal.         Thought Content: Thought content normal.         Judgment: Judgment normal.               Alana Nolasco Dions, was evaluated through a synchronous (real-time) audio-video encounter. The patient (or guardian if applicable) is aware that this is a billable service, which includes applicable co-pays. This Virtual Visit was conducted with patient's (and/or legal guardian's) consent.  The visit was conducted pursuant to the emergency declaration under the [facility] [place]note 2[place], [address] [place] [address] authority and the [employer] General Act. Patient identification was verified, and a caregiver was present when appropriate. The patient was located at: Home: [address] [place] [address] [place] [id number]  The provider was located at: Home: [unfilled]       An electronic signature was used to authenticate this note.   -- Radha Tafoya NP

## 2022-07-29 NOTE — PROGRESS NOTES
Chief Complaint   Patient presents with    Medication Evaluation     Bp meds are too high pt wants dosage dropped. Pt stated the lowest shes gotten 84/40 Went cold turkey for a week and bp got high so she     Light headed    1. \"Have you been to the ER, urgent care clinic since your last visit? Hospitalized since your last visit? \"  Pt first went bc of her bp it was hgih and , left side hurt. 2. \"Have you seen or consulted any other health care providers outside of the [address] [place] [address] since your last visit? \" No     3. For patients aged 39-70: Has the patient had a colonoscopy / FIT/ Cologuard? No      If the patient is female:    4. For patients aged 41-77: Has the patient had a mammogram within the past 2 years? No        5. For patients aged 21-65: Has the patient had a pap smear? No    [email or web address]

## 2022-08-16 RX ORDER — PROPRANOLOL HYDROCHLORIDE 40 MG/1
TABLET ORAL
Qty: 90 TABLET | Refills: 1 | Status: SHIPPED | OUTPATIENT
Start: 2022-08-16

## 2023-01-10 ENCOUNTER — PATIENT MESSAGE (OUTPATIENT)
Dept: NEUROLOGY | Age: 33
End: 2023-01-10

## 2023-01-11 ENCOUNTER — TELEPHONE (OUTPATIENT)
Dept: FAMILY MEDICINE CLINIC | Age: 33
End: 2023-01-11

## 2023-01-11 ENCOUNTER — OFFICE VISIT (OUTPATIENT)
Dept: FAMILY MEDICINE CLINIC | Age: 33
End: 2023-01-11
Payer: COMMERCIAL

## 2023-01-11 VITALS
WEIGHT: 132 LBS | RESPIRATION RATE: 16 BRPM | BODY MASS INDEX: 22.53 KG/M2 | HEART RATE: 92 BPM | DIASTOLIC BLOOD PRESSURE: 62 MMHG | SYSTOLIC BLOOD PRESSURE: 88 MMHG | OXYGEN SATURATION: 100 % | HEIGHT: 64 IN

## 2023-01-11 DIAGNOSIS — E16.2 HYPOGLYCEMIA: ICD-10-CM

## 2023-01-11 DIAGNOSIS — Z00.00 WELL ADULT EXAM: Primary | ICD-10-CM

## 2023-01-11 DIAGNOSIS — R68.82 DECREASED LIBIDO: ICD-10-CM

## 2023-01-11 DIAGNOSIS — Z13.0 SCREENING FOR DEFICIENCY ANEMIA: ICD-10-CM

## 2023-01-11 DIAGNOSIS — Z62.810 PERSONAL HISTORY OF PHYSICAL AND SEXUAL ABUSE IN CHILDHOOD: ICD-10-CM

## 2023-01-11 DIAGNOSIS — F39 MOOD DISORDER (HCC): ICD-10-CM

## 2023-01-11 DIAGNOSIS — Z13.228 ENCOUNTER FOR SCREENING FOR OTHER METABOLIC DISORDERS: ICD-10-CM

## 2023-01-11 DIAGNOSIS — Z83.3 FAMILY HISTORY OF DIABETES MELLITUS: ICD-10-CM

## 2023-01-11 DIAGNOSIS — Z13.220 SCREENING CHOLESTEROL LEVEL: ICD-10-CM

## 2023-01-11 DIAGNOSIS — R00.0 TACHYCARDIA: ICD-10-CM

## 2023-01-11 DIAGNOSIS — E55.9 VITAMIN D DEFICIENCY: ICD-10-CM

## 2023-01-11 DIAGNOSIS — Z13.29 THYROID DISORDER SCREEN: ICD-10-CM

## 2023-01-11 PROCEDURE — 99395 PREV VISIT EST AGE 18-39: CPT | Performed by: NURSE PRACTITIONER

## 2023-01-11 PROCEDURE — 99214 OFFICE O/P EST MOD 30 MIN: CPT | Performed by: NURSE PRACTITIONER

## 2023-01-11 RX ORDER — CYCLOBENZAPRINE HCL 10 MG
TABLET ORAL
Qty: 90 TABLET | Refills: 1 | Status: SHIPPED | OUTPATIENT
Start: 2023-01-11

## 2023-01-11 RX ORDER — AMITRIPTYLINE HYDROCHLORIDE 25 MG/1
TABLET, FILM COATED ORAL
COMMUNITY
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
## 2023-01-11 NOTE — TELEPHONE ENCOUNTER
----- Message from Neeru Zavala sent at 1/11/2023  1:48 PM EST -----  Subject: Message to Provider    QUESTIONS  Information for Provider? Min Cruz has a 2pm appt today but states she   is getting her flat iron changed. I t is now 1:47pm and she wants staff to   know that will still be there. She is aware there is a 15 minute window.   ---------------------------------------------------------------------------  --------------  Chris French INFO  [phone]; Do not leave any message, patient will call back for answer  ---------------------------------------------------------------------------  --------------  SCRIPT ANSWERS  Relationship to Patient?  Self

## 2023-01-14 LAB
25(OH)D3+25(OH)D2 SERPL-MCNC: 16.5 NG/ML (ref 30–100)
ALBUMIN SERPL-MCNC: 5 G/DL (ref 3.8–4.8)
ALBUMIN/GLOB SERPL: 1.9 {RATIO} (ref 1.2–2.2)
ALP SERPL-CCNC: 78 IU/L (ref 44–121)
ALT SERPL-CCNC: 15 IU/L (ref 0–32)
AST SERPL-CCNC: 18 IU/L (ref 0–40)
BASOPHILS # BLD AUTO: 0.1 X10E3/UL (ref 0–0.2)
BASOPHILS NFR BLD AUTO: 1 %
BILIRUB SERPL-MCNC: 0.5 MG/DL (ref 0–1.2)
BUN SERPL-MCNC: 13 MG/DL (ref 6–20)
BUN/CREAT SERPL: 28 (ref 9–23)
CALCIUM SERPL-MCNC: 9.6 MG/DL (ref 8.7–10.2)
CHLORIDE SERPL-SCNC: 101 MMOL/L (ref 96–106)
CHOLEST SERPL-MCNC: 187 MG/DL (ref 100–199)
CO2 SERPL-SCNC: 20 MMOL/L (ref 20–29)
CREAT SERPL-MCNC: 0.46 MG/DL (ref 0.57–1)
EGFR: 129 ML/MIN/1.73
EOSINOPHIL # BLD AUTO: 0.1 X10E3/UL (ref 0–0.4)
EOSINOPHIL NFR BLD AUTO: 1 %
ERYTHROCYTE [DISTWIDTH] IN BLOOD BY AUTOMATED COUNT: 13.1 % (ref 11.7–15.4)
EST. AVERAGE GLUCOSE BLD GHB EST-MCNC: 91 MG/DL
ESTRADIOL SERPL-MCNC: 102 PG/ML
GLOBULIN SER CALC-MCNC: 2.6 G/DL (ref 1.5–4.5)
GLUCOSE SERPL-MCNC: 83 MG/DL (ref 70–99)
HBA1C MFR BLD: 4.8 % (ref 4.8–5.6)
HCT VFR BLD AUTO: 42.6 % (ref 34–46.6)
HDLC SERPL-MCNC: 70 MG/DL
HGB BLD-MCNC: 14.6 G/DL (ref 11.1–15.9)
IMM GRANULOCYTES # BLD AUTO: 0 X10E3/UL (ref 0–0.1)
IMM GRANULOCYTES NFR BLD AUTO: 0 %
IRON SATN MFR SERPL: 64 % (ref 15–55)
IRON SERPL-MCNC: 240 UG/DL (ref 27–159)
LDLC SERPL CALC-MCNC: 101 MG/DL (ref 0–99)
LYMPHOCYTES # BLD AUTO: 3.1 X10E3/UL (ref 0.7–3.1)
LYMPHOCYTES NFR BLD AUTO: 33 %
MCH RBC QN AUTO: 29.8 PG (ref 26.6–33)
MCHC RBC AUTO-ENTMCNC: 34.3 G/DL (ref 31.5–35.7)
MCV RBC AUTO: 87 FL (ref 79–97)
MONOCYTES # BLD AUTO: 0.5 X10E3/UL (ref 0.1–0.9)
MONOCYTES NFR BLD AUTO: 5 %
NEUTROPHILS # BLD AUTO: 5.7 X10E3/UL (ref 1.4–7)
NEUTROPHILS NFR BLD AUTO: 60 %
PLATELET # BLD AUTO: 246 X10E3/UL (ref 150–450)
POTASSIUM SERPL-SCNC: 4 MMOL/L (ref 3.5–5.2)
PROLACTIN SERPL-MCNC: 9.3 NG/ML (ref 4.8–23.3)
PROT SERPL-MCNC: 7.6 G/DL (ref 6–8.5)
RBC # BLD AUTO: 4.9 X10E6/UL (ref 3.77–5.28)
SODIUM SERPL-SCNC: 139 MMOL/L (ref 134–144)
T4 FREE SERPL-MCNC: 1.25 NG/DL (ref 0.82–1.77)
TESTOST FREE SERPL-MCNC: 0.7 PG/ML (ref 0–4.2)
TESTOST SERPL-MCNC: 7 NG/DL (ref 8–60)
TIBC SERPL-MCNC: 377 UG/DL (ref 250–450)
TRIGL SERPL-MCNC: 87 MG/DL (ref 0–149)
TSH SERPL DL<=0.005 MIU/L-ACNC: 0.82 UIU/ML (ref 0.45–4.5)
UIBC SERPL-MCNC: 137 UG/DL (ref 131–425)
VLDLC SERPL CALC-MCNC: 16 MG/DL (ref 5–40)
WBC # BLD AUTO: 9.4 X10E3/UL (ref 3.4–10.8)

## 2023-01-17 ENCOUNTER — PATIENT MESSAGE (OUTPATIENT)
Dept: FAMILY MEDICINE CLINIC | Age: 33
End: 2023-01-17

## 2023-01-17 PROBLEM — Z13.29 THYROID DISORDER SCREEN: Status: ACTIVE | Noted: 2022-05-20

## 2023-01-17 PROBLEM — Z83.3 FAMILY HISTORY OF DIABETES MELLITUS: Status: ACTIVE | Noted: 2023-01-17

## 2023-01-17 PROBLEM — R68.82 DECREASED LIBIDO: Status: ACTIVE | Noted: 2023-01-17

## 2023-01-17 PROBLEM — Z13.220 SCREENING CHOLESTEROL LEVEL: Status: ACTIVE | Noted: 2022-05-20

## 2023-01-17 PROBLEM — Z13.0 SCREENING FOR DEFICIENCY ANEMIA: Status: ACTIVE | Noted: 2022-05-20

## 2023-01-17 PROBLEM — Z13.228 ENCOUNTER FOR SCREENING FOR OTHER METABOLIC DISORDERS: Status: ACTIVE | Noted: 2022-05-20

## 2023-01-17 PROBLEM — E16.2 HYPOGLYCEMIA: Status: ACTIVE | Noted: 2023-01-17

## 2023-01-17 NOTE — PROGRESS NOTES
2 of your kidney labs are slightly abnormal but nothing urgent or emergent at this time we will continue to monitor them. Your other labs are basically normal.  There is nothing unusual about the labs checking your hormone levels. There are abnormalities in your iron profile. Seem to remember that you followed by hematology but please confirm that with the nurse. Your vitamin D is also substantially below normal.  Please continue with taking her vitamin D regularly know if you need a refill. Some values may be minimally outside the  \"normal\" range but are not harmful or clinically significant. Please contact the office if you have questions or concerns. We will recheck all your labs at your next office visit.

## 2023-01-18 NOTE — TELEPHONE ENCOUNTER
From: Taylor Jones  To: Rosy Mathis NP  Sent: 1/17/2023 5:54 PM EST  Subject: From [facility]     Hi Miss Krystin Perez! I would like to know how many milligrams of iron pills I can start taking (from over-the-counter) but also to keep an mine because Im traveling to [facility] I need to take everything as prescriptions proper prescription containers ! so I may need a prescription of iron pills ! like for example right now I have my period and Im bleeding big cloth of blood and when I have my cycle I feel pretty dizzy so and very sleep ! Im a little bit concerned about my iron ! could that are the normality of my kidneys could be part of the hydration ! Its very hard to drink water at my work! I will feel much eddies if I am able to speak to you whenever you have time ! thank you so much ,have a good night!

## 2023-01-25 ENCOUNTER — TELEPHONE (OUTPATIENT)
Dept: FAMILY MEDICINE CLINIC | Age: 33
End: 2023-01-25

## 2023-01-25 NOTE — TELEPHONE ENCOUNTER
----- Message from Marysol Combs sent at 1/25/2023  4:27 PM EST -----  Subject: Message to Provider    QUESTIONS  Information for Provider? Patient is wanting a call back regarding FMLA   paperwork. Patient is wanting a call back as soon as possible.   ---------------------------------------------------------------------------  --------------  [id number] [employer]  [phone]; OK to leave message on voicemail  ---------------------------------------------------------------------------  --------------  SCRIPT ANSWERS  Relationship to Patient?  Self

## 2023-01-26 ENCOUNTER — PATIENT MESSAGE (OUTPATIENT)
Dept: FAMILY MEDICINE CLINIC | Age: 33
End: 2023-01-26

## 2023-01-26 NOTE — TELEPHONE ENCOUNTER
----- Message from Kailey Lopez sent at 1/26/2023  3:53 PM EST -----  Subject: Message to Provider    QUESTIONS  Information for Provider? Pt calling to check on her FMLA. It was due on   1/25 to her employer. Please call pt to advise on status.  ---------------------------------------------------------------------------  --------------  Shirin Valladares [facility]  [phone]; OK to leave message on voicemail  ---------------------------------------------------------------------------  --------------  SCRIPT ANSWERS  Relationship to Patient?  Self

## 2023-01-27 RX ORDER — MECLIZINE HYDROCHLORIDE 25 MG/1
25 TABLET ORAL
Qty: 60 TABLET | Refills: 1 | Status: SHIPPED | OUTPATIENT
Start: 2023-01-27

## 2023-01-27 RX ORDER — FERROUS SULFATE 325(65) MG
325 TABLET, DELAYED RELEASE (ENTERIC COATED) ORAL
Qty: 90 TABLET | Refills: 1 | Status: SHIPPED | OUTPATIENT
Start: 2023-01-27

## 2023-01-27 RX ORDER — ONDANSETRON 8 MG/1
8 TABLET, ORALLY DISINTEGRATING ORAL
Qty: 60 TABLET | Refills: 1 | Status: SHIPPED | OUTPATIENT
Start: 2023-01-27

## 2023-02-03 ENCOUNTER — TELEPHONE (OUTPATIENT)
Dept: FAMILY MEDICINE CLINIC | Age: 33
End: 2023-02-03

## 2023-02-03 DIAGNOSIS — R79.0 ABNORMAL SERUM IRON LEVEL: Primary | ICD-10-CM

## 2023-02-03 NOTE — TELEPHONE ENCOUNTER
I informed Pt to discontinue iron for now as it was high and taking iron TID was not needed at this time. I explained to pt she would have needed hematology for any issues with her blood like the Iron. Pt does not see a hematologist.    Pt stated she will need a letter to explain why she is on her medications and why she needs them to get them through customs for her [facility] trip.  She will need this by 4/7

## 2023-02-03 NOTE — TELEPHONE ENCOUNTER
Patient is calling because she states she doesn't understand why she needs to take iron 325mg TID if her iron is high? Also she isn't understanding why she needs to see a hematologist. If someone can call and explain information to her please.

## 2023-02-06 ENCOUNTER — TELEPHONE (OUTPATIENT)
Dept: FAMILY MEDICINE CLINIC | Age: 33
End: 2023-02-06

## 2023-02-06 DIAGNOSIS — R79.0 ABNORMAL RESULT OF IRON PROFILE TESTING: Primary | ICD-10-CM

## 2023-02-06 NOTE — TELEPHONE ENCOUNTER
Patient would like a nurse to read off her lab results because she is taking iron pills TID and her iron was way too high.  Please advise

## 2023-02-07 ENCOUNTER — HOSPITAL ENCOUNTER (EMERGENCY)
Age: 33
Discharge: HOME | End: 2023-02-07
Attending: EMERGENCY MEDICINE
Payer: COMMERCIAL

## 2023-02-07 VITALS
HEART RATE: 110 BPM | DIASTOLIC BLOOD PRESSURE: 84 MMHG | RESPIRATION RATE: 20 BRPM | TEMPERATURE: 98 F | BODY MASS INDEX: 21.68 KG/M2 | OXYGEN SATURATION: 100 % | WEIGHT: 127 LBS | SYSTOLIC BLOOD PRESSURE: 134 MMHG | HEIGHT: 64 IN

## 2023-02-07 DIAGNOSIS — G43.909 MIGRAINE WITHOUT STATUS MIGRAINOSUS, NOT INTRACTABLE, UNSPECIFIED MIGRAINE TYPE: Primary | ICD-10-CM

## 2023-02-07 LAB
APPEARANCE UR: CLEAR
BACTERIA URNS QL MICRO: NEGATIVE /HPF
BILIRUB UR QL: NEGATIVE
COLOR UR: YELLOW
EPITH CASTS URNS QL MICRO: ABNORMAL /LPF
GLUCOSE UR STRIP.AUTO-MCNC: NEGATIVE MG/DL
HGB UR QL STRIP: ABNORMAL
KETONES UR QL STRIP.AUTO: NEGATIVE MG/DL
LEUKOCYTE ESTERASE UR QL STRIP.AUTO: NEGATIVE
NITRITE UR QL STRIP.AUTO: NEGATIVE
PH UR STRIP: 6 (ref 5–8)
PROT UR STRIP-MCNC: NEGATIVE MG/DL
RBC #/AREA URNS HPF: ABNORMAL /HPF (ref 0–5)
SP GR UR REFRACTOMETRY: 1.02 (ref 1–1.03)
UROBILINOGEN UR QL STRIP.AUTO: 0.1 EU/DL (ref 0.2–1)
WBC URNS QL MICRO: ABNORMAL /HPF (ref 0–4)
YEAST URNS QL MICRO: PRESENT

## 2023-02-07 PROCEDURE — 81001 URINALYSIS AUTO W/SCOPE: CPT

## 2023-02-07 PROCEDURE — 99283 EMERGENCY DEPT VISIT LOW MDM: CPT

## 2023-02-07 RX ORDER — BUTALBITAL, ACETAMINOPHEN AND CAFFEINE 300; 40; 50 MG/1; MG/1; MG/1
1 CAPSULE ORAL
Qty: 5 CAPSULE | Refills: 0 | Status: SHIPPED | OUTPATIENT
Start: 2023-02-07

## 2023-02-07 NOTE — TELEPHONE ENCOUNTER
Referral to:  Abnormal iron profile  [facility] [address], [place], [address]  Phone: [phone]

## 2023-02-07 NOTE — LETTER
NOTIFICATION RETURN TO WORK / SCHOOL    2/7/2023 10:48 PM    Ms[facility] Aubrey Castro  [facility] [address] [place] [id number]      To Whom It May Concern:    Aubrey Castro is currently under the care of [address] [place] [address]. She will return to work/school on: 2/10/23    If there are questions or concerns please have the patient contact our office. Sincerely,      OBED Valentino MD

## 2023-02-07 NOTE — TELEPHONE ENCOUNTER
----- Message from Curry Ashton sent at 2/7/2023  3:33 PM EST -----  Subject: Referral Request    Reason for referral request? Pt would like to have orders put in chart for   kidney function and iron levels due to the recent reading 240 on 01/11 and   the current pain she is experiencing in her kidneys. Please contact ASAP   to once orders in your chart to get appt scheduled. Provider patient wants to be referred to(if known):     Provider Phone Number(if known):     Additional Information for Provider?   ---------------------------------------------------------------------------  --------------  [address]    [phone]; OK to leave message on voicemail,OK to respond with electronic   message via [employer] portal (only for patients who have registered [employer]   account)  ---------------------------------------------------------------------------  --------------

## 2023-02-07 NOTE — TELEPHONE ENCOUNTER
Pt stated that she has been taking iron for a while now and was taking iron when her labs were done recently. She stated that she has since stopped taking it because her levels were elevated and thought that it was causing other issues (increase migraines and inflammation). She has not taken any iron since 2/3/23 when she talked to [facility] last.  She has started to make changes in her diet to try to remove some of the excess iron. Pt wants to know what she needs to do at this point since iron level was high, does she need to take the medication? She also wants to see a hematologist because she has never seen one to find out how to remove the excess iron.

## 2023-02-08 NOTE — DISCHARGE INSTRUCTIONS
Thank you! Thank you for allowing me to care for you in the emergency department. It is my goal to provide you with excellent care. If you have not received excellent quality care, please ask to speak to the nurse manager. Please fill out the survey that will come to you by mail or email since we listen to your feedback! Below you will find a list of your tests from today's visit. Should you have any questions, please do not hesitate to call the emergency department. Labs  Recent Results (from the past 12 hour(s))   URINALYSIS W/ RFLX MICROSCOPIC    Collection Time: 02/07/23  9:30 PM   Result Value Ref Range    Color Yellow      Appearance Clear Clear      Specific gravity 1.025 1.003 - 1.030      pH (UA) 6.0 5.0 - 8.0      Protein Negative Negative mg/dL    Glucose Negative Negative mg/dL    Ketone Negative Negative mg/dL    Bilirubin Negative Negative      Blood Small (A) Negative      Urobilinogen 0.1 (L) 0.2 - 1.0 EU/dL    Nitrites Negative Negative      Leukocyte Esterase Negative Negative     URINE MICROSCOPIC    Collection Time: 02/07/23  9:30 PM   Result Value Ref Range    WBC 0-4 0 - 4 /hpf    RBC 0-5 0 - 5 /hpf    Epithelial cells Few Few /lpf    Bacteria Negative Negative /hpf    Yeast Present (A) Negative         Radiologic Studies  No orders to display     CT Results  (Last 48 hours)      None          CXR Results  (Last 48 hours)      None          ------------------------------------------------------------------------------------------------------------  The exam and treatment you received in the Emergency Department were for an urgent problem and are not intended as complete care. It is important that you follow-up with a doctor, nurse practitioner, or physician assistant to:  (1) confirm your diagnosis,  (2) re-evaluation of changes in your illness and treatment, and  (3) for ongoing care. Please take your discharge instructions with you when you go to your follow-up appointment.      If you have any problem arranging a follow-up appointment, contact the Emergency Department. If your symptoms become worse or you do not improve as expected and you are unable to reach your health care provider, please return to the Emergency Department. We are available 24 hours a day. If a prescription has been provided, please have it filled as soon as possible to prevent a delay in treatment. If you have any questions or reservations about taking the medication due to side effects or interactions with other medications, please call your primary care provider or contact the ER.

## 2023-02-08 NOTE — ED TRIAGE NOTES
Patient had lab work done with PCP. Showed iron \"abnormality\". Was placed on iron pills. However states was told Iron levels were high. Complaint of diarrhea, nausea and migraine today.

## 2023-02-08 NOTE — ED PROVIDER NOTES
[facility] EMERGENCY DEPARTMENT  EMERGENCY DEPARTMENT HISTORY AND PHYSICAL EXAM      Date: 2/7/2023  Patient Name: Kennedy Farmer  MRN: [id number]  [place]: 1990  Date of evaluation: 2/7/2023  Provider: Adelina Madison MD   Note Started: 3:34 AM 2/8/23    HISTORY OF PRESENT ILLNESS     Chief Complaint   Patient presents with    Diarrhea    Nausea    Migraine    Abdominal Pain       History Provided By: Patient    HPI: Kennedy Farmer, 34 y.o. female presents ambulatory to the ED with complaints of recurrent migraine. Patient reports daily migraines and upwards of anywhere from 5-15 migraines a day. More recently they have been better controlled with shorter episodes and less frequency on a daily basis. Her headache is consistent with prior migraines. She is on alternating days of amitriptyline. She also been on iron supplementation but was concerned because in January her blood work showed elevated iron levels. She has discontinued that a few days ago. Patient has also had some nausea and diarrhea that she was unsure if was related to her iron levels. Patient denies any dark stools, any bright red stools, or hematemesis. She is wondering if she should have her iron levels checked again. However her most recent test was 2 weeks ago. Although she had been on multiple medications for migraines on daily basis over the past year she has weaned herself off of those and relied more on diet and self-care which in her opinion has helped dramatically. Did also talk about being excited but also nervous and myriad of emotions about going back to her home country to see her biological siblings. She is adopted child is very happy with her upbringing here but has plans to travel back in March.     PAST MEDICAL HISTORY   Past Medical History:  Past Medical History:   Diagnosis Date    Herpes genitalia     Kidney stones     Migraines     UTI (urinary tract infection)     Vitamin D deficiency        Past Surgical History:  Past Surgical History:   Procedure Laterality Date    HX  SECTION  2020       Family History:  Family History   Adopted: Yes   Problem Relation Age of Onset    No Known Problems Mother     No Known Problems Father     Hypertension Sister     Heart Disease Sister        Social History:  Social History     Tobacco Use    Smoking status: Never    Smokeless tobacco: Never   Vaping Use    Vaping Use: Never used   Substance Use Topics    Alcohol use: Not Currently    Drug use: Never       Allergies: Allergies   Allergen Reactions    Aspirin Other (comments)     Other reaction(s): Facial Swelling       PCP: Daiana Goodman NP    Current Meds:   Discharge Medication List as of 2023 10:44 PM        CONTINUE these medications which have NOT CHANGED    Details   ferrous sulfate (IRON) 325 mg (65 mg iron) EC tablet Take 1 Tablet by mouth three (3) times daily (with meals). , Normal, Disp-90 Tablet, R-1      meclizine (ANTIVERT) 25 mg tablet Take 1 Tablet by mouth three (3) times daily as needed for Dizziness., Normal, Disp-60 Tablet, R-1      ondansetron (ZOFRAN ODT) 8 mg disintegrating tablet Take 1 Tablet by mouth every eight (8) hours as needed for Nausea or Vomiting., Normal, Disp-60 Tablet, R-1      amitriptyline (ELAVIL) 25 mg tablet Take  by mouth nightly., Historical Med      cyclobenzaprine (FLEXERIL) 10 mg tablet Take one tablet as needed at the onset of migraine, Normal, Disp-90 Tablet, R-1      propranoloL (INDERAL) 40 mg tablet Take one tablet by mouth every other day, Normal, Disp-90 Tablet, R-1      COQ10, LIPOSOMAL UBIQUINOL, PO Take  by mouth daily. , Historical Med      Biotin 2,500 mcg cap Take  by mouth., Historical Med      ferrous sulfate 325 mg (65 mg iron) tablet Take 65 mg by mouth Daily (before breakfast). , Historical Med      multivitamin (ONE A DAY) tablet Take 1 Tablet by mouth daily. , Historical Med      multivitamin (Hair,Skin and Nails) tablet Take 1 Tablet by mouth daily., Historical Med      melatonin 5 mg cap capsule Take  by mouth nightly., Historical Med      cholecalciferol, vitamin D3, 50 mcg (2,000 unit) tab Take  by mouth., Historical Med      ibuprofen (MOTRIN) 800 mg tablet Take  by mouth., Historical Med             REVIEW OF SYSTEMS   Review of Systems   Constitutional:  Negative for activity change, appetite change, fatigue and fever. HENT: Negative. Negative for congestion, rhinorrhea and sore throat. Respiratory: Negative. Negative for cough, shortness of breath and wheezing. Cardiovascular: Negative. Negative for chest pain and leg swelling. Gastrointestinal:  Positive for diarrhea and nausea. Negative for abdominal distention, abdominal pain, constipation and vomiting. Endocrine: Negative. Genitourinary:  Negative for difficulty urinating, dysuria and menstrual problem. Musculoskeletal: Negative. Negative for arthralgias, joint swelling and myalgias. Skin: Negative. Negative for rash. Neurological: Negative. Negative for dizziness, weakness, light-headedness and headaches. Psychiatric/Behavioral: Negative. Positives and Pertinent negatives as per HPI. PHYSICAL EXAM     ED Triage Vitals   ED Encounter Vitals Group      BP 02/07/23 2120 134/84      Pulse (Heart Rate) 02/07/23 2123 (!) 110      Resp Rate 02/07/23 2120 20      Temp 02/07/23 2120 98 °F (36.7 °C)      Temp src --       O2 Sat (%) 02/07/23 2120 100 %      Weight 02/07/23 2121 127 lb      Height 02/07/23 2121 5' 4\"      Physical Exam  Vitals and nursing note reviewed. Constitutional:       General: She is not in acute distress. HENT:      Head: Atraumatic. Eyes:      Conjunctiva/sclera: Conjunctivae normal.      Pupils: Pupils are equal, round, and reactive to light. Cardiovascular:      Rate and Rhythm: Normal rate and regular rhythm. Heart sounds: Normal heart sounds. No murmur heard.   Pulmonary:      Effort: Pulmonary effort is normal. No respiratory distress. Breath sounds: Normal breath sounds. No wheezing or rales. Chest:      Chest wall: No tenderness. Abdominal:      General: Bowel sounds are normal. There is no distension. Palpations: Abdomen is soft. There is no mass. Tenderness: There is no abdominal tenderness. There is no guarding or rebound. Musculoskeletal:         General: Normal range of motion. Cervical back: Normal range of motion. Skin:     General: Skin is warm. Findings: No erythema or rash. Neurological:      General: No focal deficit present. Mental Status: She is alert. Cranial Nerves: No cranial nerve deficit. Psychiatric:         Mood and Affect: Mood is anxious. SCREENINGS               No data recorded        LAB, EKG AND DIAGNOSTIC RESULTS   Labs:  Recent Results (from the past 12 hour(s))   URINALYSIS W/ RFLX MICROSCOPIC    Collection Time: 02/07/23  9:30 PM   Result Value Ref Range    Color Yellow      Appearance Clear Clear      Specific gravity 1.025 1.003 - 1.030      pH (UA) 6.0 5.0 - 8.0      Protein Negative Negative mg/dL    Glucose Negative Negative mg/dL    Ketone Negative Negative mg/dL    Bilirubin Negative Negative      Blood Small (A) Negative      Urobilinogen 0.1 (L) 0.2 - 1.0 EU/dL    Nitrites Negative Negative      Leukocyte Esterase Negative Negative     URINE MICROSCOPIC    Collection Time: 02/07/23  9:30 PM   Result Value Ref Range    WBC 0-4 0 - 4 /hpf    RBC 0-5 0 - 5 /hpf    Epithelial cells Few Few /lpf    Bacteria Negative Negative /hpf    Yeast Present (A) Negative         EKG: Initial EKG interpreted by me. Shows     Radiologic Studies:  Non-plain film images such as CT, Ultrasound and MRI are read by the radiologist. Plain radiographic images are visualized and preliminarily interpreted by the ED Provider with the below findings:    *    Interpretation per the Radiologist below, if available at the time of this note:  No results found.      PROCEDURES Unless otherwise noted below, none. Performed by: Vanesa Hamm MD   Procedures      CRITICAL CARE TIME       ED COURSE and DIFFERENTIAL DIAGNOSIS/MDM   Vitals:    Vitals:    02/07/23 2120 02/07/23 2121 02/07/23 2123   BP: 134/84     Pulse:   (!) 110   Resp: 20     Temp: 98 °F (36.7 °C)     SpO2: 100%     Weight:  57.6 kg (127 lb)    Height:  5' 4\" (1.626 m)         Patient was given the following medications:  Medications - No data to display    CONSULTS: (Who and What was discussed)  None     Chronic Conditions: Migraines  Social Determinants affecting Dx or Tx: None  Counseling:     Records Reviewed (source and summary of external notes): Nursing notes    CC/HPI Summary, DDx, ED Course, and Reassessment: Patient has a complex migraine history that has improved over the past year under the management of her new PCP Dr. Dilcia Ashford. Patient is requesting something for her headache tonight as she typically relies on Tylenol. Because of her nausea offered her Zofran with Fioricet but she preferred to pick it up on her way home and take them both at home. She believes Zofran makes her too tired to drive. Patient also discussed significant stressor of returning to her home land of [place] [facility] to see her biological siblings which she has not seen in 10 years. She is an adopted child herself. Disposition Considerations (Tests not done, Shared Decision Making, Pt Expectation of Test or Treatment.):  Long conversation about iron studies and importance of follow-up with her PCP. Patient just had full panel of blood work done 2 weeks ago and discussed with her that at this point without any change in her health it would be unlikely to be changed since last studies were obtained. She can follow-up with her PCP and she endorsed understanding and was comfortable with the plan. FINAL IMPRESSION     1.  Migraine without status migrainosus, not intractable, unspecified migraine type          DISPOSITION/PLAN Discharged    Discharge Note: The patient is stable for discharge home. The signs, symptoms, diagnosis, and discharge instructions have been discussed, understanding conveyed, and agreed upon. The patient is to follow up as recommended or return to ER should their symptoms worsen. PATIENT REFERRED TO:  Follow-up Information       Follow up With Specialties Details Why Contact Eliane Fuentes NP Nurse Practitioner  Follow-up as planned [place] [address] [address] [facility]      [address] [facility] Emergency Medicine  As needed, If symptoms worsen [address] [place] [id number] [phone]              DISCHARGE MEDICATIONS:  Discharge Medication List as of 2/7/2023 10:44 PM        START taking these medications    Details   butalbital-acetaminophen-caff (Fioricet) -40 mg per capsule Take 1 Capsule by mouth every four (4) hours as needed for Headache or Migraine for up to 5 doses. , Normal, Disp-5 Capsule, R-0           CONTINUE these medications which have NOT CHANGED    Details   ferrous sulfate (IRON) 325 mg (65 mg iron) EC tablet Take 1 Tablet by mouth three (3) times daily (with meals). , Normal, Disp-90 Tablet, R-1      meclizine (ANTIVERT) 25 mg tablet Take 1 Tablet by mouth three (3) times daily as needed for Dizziness., Normal, Disp-60 Tablet, R-1      ondansetron (ZOFRAN ODT) 8 mg disintegrating tablet Take 1 Tablet by mouth every eight (8) hours as needed for Nausea or Vomiting., Normal, Disp-60 Tablet, R-1      amitriptyline (ELAVIL) 25 mg tablet Take  by mouth nightly., Historical Med      cyclobenzaprine (FLEXERIL) 10 mg tablet Take one tablet as needed at the onset of migraine, Normal, Disp-90 Tablet, R-1      propranoloL (INDERAL) 40 mg tablet Take one tablet by mouth every other day, Normal, Disp-90 Tablet, R-1      COQ10, LIPOSOMAL UBIQUINOL, PO Take  by mouth daily. , Historical Med      Biotin 2,500 mcg cap Take  by mouth., Historical Med      ferrous sulfate 325 mg (65 mg iron) tablet Take 65 mg by mouth Daily (before breakfast). , Historical Med      multivitamin (ONE A DAY) tablet Take 1 Tablet by mouth daily. , Historical Med      multivitamin (Hair,Skin and Nails) tablet Take 1 Tablet by mouth daily. , Historical Med      melatonin 5 mg cap capsule Take  by mouth nightly., Historical Med      cholecalciferol, vitamin D3, 50 mcg (2,000 unit) tab Take  by mouth., Historical Med      ibuprofen (MOTRIN) 800 mg tablet Take  by mouth., Historical Med               DISCONTINUED MEDICATIONS:  Discharge Medication List as of 2/7/2023 10:44 PM          I am the Primary Clinician of Record: Jevon Duron MD (electronically signed)    (Please note that parts of this dictation were completed with voice recognition software. Quite often unanticipated grammatical, syntax, homophones, and other interpretive errors are inadvertently transcribed by the computer software. Please disregards these errors.  Please excuse any errors that have escaped final proofreading.)

## 2023-02-16 PROBLEM — Z13.0 SCREENING FOR DEFICIENCY ANEMIA: Status: RESOLVED | Noted: 2022-05-20 | Resolved: 2023-02-16

## 2023-02-28 ENCOUNTER — TELEPHONE (OUTPATIENT)
Dept: FAMILY MEDICINE CLINIC | Age: 33
End: 2023-02-28

## 2023-02-28 NOTE — TELEPHONE ENCOUNTER
----- Message from Donte Azevedo sent at 2/28/2023  3:06 PM EST -----  Subject: Message to Provider    QUESTIONS  Information for Provider? Patient is wanting to know if she is able to get   additional medication for when she travels out of the country. Patient is   also wanting to know what she can do to prevent getting malaria, typhoid,   and tuberculosis while traveling. Please call patient back to discuss. ---------------------------------------------------------------------------  --------------  Nhi ROME  [phone]; OK to leave message on voicemail,OK to respond with electronic   message via [employer] portal (only for patients who have registered [employer]   account)  ---------------------------------------------------------------------------  --------------  SCRIPT ANSWERS  Relationship to Patient?  Self

## 2023-03-01 ENCOUNTER — PATIENT MESSAGE (OUTPATIENT)
Dept: FAMILY MEDICINE CLINIC | Age: 33
End: 2023-03-01

## 2023-03-01 NOTE — TELEPHONE ENCOUNTER
My chart message to patient to find out where overseas she will be going so I can treat prophylactically for the specific destination

## 2023-03-24 ENCOUNTER — VIRTUAL VISIT (OUTPATIENT)
Dept: FAMILY MEDICINE CLINIC | Age: 33
End: 2023-03-24

## 2023-03-24 DIAGNOSIS — Z78.9 HISTORY OF FOREIGN TRAVEL: Primary | ICD-10-CM

## 2023-03-24 DIAGNOSIS — T75.3XXD: ICD-10-CM

## 2023-03-24 NOTE — PROGRESS NOTES
Chief Complaint   Patient presents with    Medication Problem     Needs letter for carrying meds in [facility]. Letter needs to stats she is under medical treatment and the names of what she is taking. Letter should include:  Women's Multivitamin  Vitamin D   Garlic   Probiotic  Amitriptyline  Propanolol. Zyrtec   Tylenol Extra Strength    Medication Refill     Needs nausea meds only a few of them last time they gave her 90 it was expensive. 1. \"Have you been to the ER, urgent care clinic since your last visit? Hospitalized since your last visit? \" No    2. \"Have you seen or consulted any other health care providers outside of the [address] [place] [address] since your last visit? \" No     3. For patients aged 39-70: Has the patient had a colonoscopy / FIT/ Cologuard? NA - based on age      If the patient is female:    4. For patients aged 41-77: Has the patient had a mammogram within the past 2 years? NA - based on age or sex      11. For patients aged 21-65: Has the patient had a pap smear?  NA - based on age or sex

## 2023-04-02 RX ORDER — PROPRANOLOL HYDROCHLORIDE 20 MG/1
1 TABLET ORAL EVERY OTHER DAY
COMMUNITY

## 2023-04-02 RX ORDER — PROPRANOLOL HYDROCHLORIDE 20 MG/1
20 TABLET ORAL 3 TIMES DAILY
COMMUNITY
End: 2023-04-02

## 2023-04-02 RX ORDER — MECLIZINE HYDROCHLORIDE 25 MG/1
25 TABLET ORAL
Qty: 10 TABLET | Refills: 0 | Status: SHIPPED | OUTPATIENT
Start: 2023-04-02

## 2023-04-02 NOTE — PROGRESS NOTES
Darcie Singh (: 1990) is a 28 y.o. female, established patient, here for evaluation of the following chief complaint(s):   Medication Problem (Needs letter for carrying meds in [facility]. Letter needs to stats she is under medical treatment and the names of what she is taking. Letter should include:/Women's Multivitamin/Vitamin D /Garlic /Probiotic/Amitriptyline/Propanolol. Loretha [place] Diana Jeronimo Extra Strength) and Medication Refill (Needs nausea meds only a few of them last time they gave her 80 it was expensive.)       ASSESSMENT/PLAN:  Below is the assessment and plan developed based on review of pertinent history, labs, studies, and medications. 1. History of foreign travel  We will be providing documentation for her visit to to [facility] of her ordered medications. 2. Airplane sickness, subsequent encounter  Refilling meclizine. SUBJECTIVE/OBJECTIVE:  mitra Aguilar (: 1990) is a 28 y.o. female, established patient, here for evaluation of the following chief complaint(s):   Medication Problem (Needs letter for carrying meds in [facility]. Letter needs to state that she is under medical treatment and the names of what she is taking. Letter should include:/Women's Multivitamin/Vitamin D /Garlic  and Probiotics. Patient is also asking for a refill of her meclizine as she has a history of air sickness. Patient is leaving [address] [place] [address]. Review of Systems   ROS per HPI and PMH           Physical Exam  HENT:      Head: Normocephalic. Neurological:      Mental Status: She is alert and oriented to person, place, and time. Psychiatric:         Mood and Affect: Mood normal.         Behavior: Behavior normal.         Thought Content: Thought content normal.         Judgment: Judgment normal.               Alana Aguilar, was evaluated through a synchronous (real-time) audio-video encounter.  The patient (or guardian if applicable) is aware that this is a billable service, which includes applicable co-pays. This Virtual Visit was conducted with patient's (and/or legal guardian's) consent. The visit was conducted pursuant to the emergency declaration under the [address] [place] [address] authority and the [employer] Act. Patient identification was verified, and a caregiver was present when appropriate. The patient was located at: Home: [address] [place] [address] Dr Antoine Liner [phone]  The provider was located at: [facility]       An electronic signature was used to authenticate this note.   -- Bryson Berg NP

## 2023-04-05 ENCOUNTER — TELEPHONE (OUTPATIENT)
Dept: FAMILY MEDICINE CLINIC | Age: 33
End: 2023-04-05

## 2023-04-05 NOTE — TELEPHONE ENCOUNTER
Patient calling regards to the letter that she needs to be able to carry medications out of country.  Patient leaves tomorrow     Patient also stated that propranolol was supposed to be updated to 25 mg

## 2023-05-03 ENCOUNTER — NURSE TRIAGE (OUTPATIENT)
Dept: OTHER | Facility: CLINIC | Age: 33
End: 2023-05-03

## 2023-08-11 ENCOUNTER — TELEMEDICINE (OUTPATIENT)
Facility: CLINIC | Age: 33
End: 2023-08-11
Payer: COMMERCIAL

## 2023-08-11 DIAGNOSIS — R00.0 TACHYCARDIA: ICD-10-CM

## 2023-08-11 DIAGNOSIS — Z11.4 ENCOUNTER FOR SCREENING FOR HIV: ICD-10-CM

## 2023-08-11 DIAGNOSIS — R79.0 ABNORMAL RESULT OF IRON PROFILE TESTING: ICD-10-CM

## 2023-08-11 DIAGNOSIS — G43.901 MIGRAINE WITH STATUS MIGRAINOSUS, NOT INTRACTABLE, UNSPECIFIED MIGRAINE TYPE: ICD-10-CM

## 2023-08-11 DIAGNOSIS — Z13.228 ENCOUNTER FOR SCREENING FOR OTHER METABOLIC DISORDERS: ICD-10-CM

## 2023-08-11 DIAGNOSIS — Z13.1 DIABETES MELLITUS SCREENING: ICD-10-CM

## 2023-08-11 DIAGNOSIS — L65.9 HAIR LOSS: Primary | ICD-10-CM

## 2023-08-11 DIAGNOSIS — Z13.220 SCREENING CHOLESTEROL LEVEL: ICD-10-CM

## 2023-08-11 DIAGNOSIS — E16.2 HYPOGLYCEMIA: ICD-10-CM

## 2023-08-11 DIAGNOSIS — L98.9 SKIN DISORDER: ICD-10-CM

## 2023-08-11 DIAGNOSIS — E55.9 VITAMIN D DEFICIENCY: ICD-10-CM

## 2023-08-11 PROCEDURE — 99214 OFFICE O/P EST MOD 30 MIN: CPT | Performed by: NURSE PRACTITIONER

## 2023-08-11 RX ORDER — AMITRIPTYLINE HYDROCHLORIDE 10 MG/1
20 TABLET, FILM COATED ORAL NIGHTLY
Qty: 180 TABLET | Refills: 1 | Status: SHIPPED | OUTPATIENT
Start: 2023-08-11

## 2023-08-11 RX ORDER — PROPRANOLOL HYDROCHLORIDE 20 MG/1
TABLET ORAL
Qty: 90 TABLET | Refills: 1 | Status: SHIPPED | OUTPATIENT
Start: 2023-08-11

## 2023-08-11 NOTE — PROGRESS NOTES
Chief Complaint   Patient presents with    Migraine     No data recorded    1. Have you been to the ER, urgent care clinic since your last visit? Hospitalized since your last visit? No    2. Have you seen or consulted any other health care providers outside of the [address] [place] [address] since your last visit? No     3. For patients aged 43-73: Has the patient had a colonoscopy / FIT/ Cologuard? NA - based on age/sex    If the patient is female:    4. For patients aged 43-66: Has the patient had a mammogram within the past 2 years? NA - based on age/sex      5. For patients aged 21-65: Has the patient had a pap smear? Yes-No Care Gap Present    There were no vitals taken for this visit. No flowsheet data found.   [phone]

## 2023-08-11 NOTE — PROGRESS NOTES
Zion Cuba (:  1990) is a Established patient, presenting virtually for evaluation of the following: Her chronic conditions include vitamin D deficiency, history of tachycardia, migraines, mood disorders, skin disorder and she has a current complaint of hair loss,. She states that this has been going on for about 2 months and she has not found anything that makes it better or worse and she has not changed anything with her personal use products but does state that she had started taking a probiotic. She is seen by Dermatology for a skin disorder and will discuss with them at her next office visit. she states that her migraines are much better. But she still has to be off from work with exacerbations and requires complete darkness and a cool, quiet room which is not possible in her current work environment and will need documentation to be able to use her sick leave. She has agreed to come in for fasting labs    Assessment & Plan   Below is the assessment and plan developed based on review of pertinent history, physical exam, labs, studies, and medications. 1. Hair loss  Patient will discuss with her dermatologist at her next office visit with them  2. Hypoglycemia  Obtaining updated A1C for trending and will make treatment decisions when I get the results  3. Vitamin D deficiency  -     Vitamin D 25 Hydroxy  Obtaining updated Vit D for trending and will make treatment decisions when I get the results  4. Encounter for screening for HIV  -     HIV 1/2 Ag/Ab, 4TH Generation,W Rflx Confirm  Screening for HIV and will refer to ID specialist with positive testing result  5. Screening cholesterol level  -     Lipid Panel  Obtaining updated lipid panel for trending and will make treatment decisions when I get the results  6.  Abnormal result of iron profile testing  -     CBC with Auto Differential  -     Iron and TIBC  Obtaining updated CBC and Iron and TIBC for trending and will make treatment

## 2023-09-10 PROBLEM — Z13.1 DIABETES MELLITUS SCREENING: Status: RESOLVED | Noted: 2022-05-20 | Resolved: 2023-09-10

## 2023-10-14 ENCOUNTER — APPOINTMENT (OUTPATIENT)
Facility: HOSPITAL | Age: 33
End: 2023-10-14
Payer: COMMERCIAL

## 2023-10-14 ENCOUNTER — HOSPITAL ENCOUNTER (EMERGENCY)
Facility: HOSPITAL | Age: 33
Discharge: HOME | End: 2023-10-14
Attending: STUDENT IN AN ORGANIZED HEALTH CARE EDUCATION/TRAINING PROGRAM
Payer: COMMERCIAL

## 2023-10-14 VITALS
DIASTOLIC BLOOD PRESSURE: 68 MMHG | BODY MASS INDEX: 21.68 KG/M2 | HEART RATE: 72 BPM | SYSTOLIC BLOOD PRESSURE: 105 MMHG | RESPIRATION RATE: 16 BRPM | TEMPERATURE: 98.2 F | WEIGHT: 127 LBS | OXYGEN SATURATION: 97 % | HEIGHT: 64 IN

## 2023-10-14 DIAGNOSIS — R10.31 RIGHT LOWER QUADRANT ABDOMINAL PAIN: ICD-10-CM

## 2023-10-14 DIAGNOSIS — N30.00 ACUTE CYSTITIS WITHOUT HEMATURIA: ICD-10-CM

## 2023-10-14 DIAGNOSIS — R10.9 RIGHT FLANK PAIN: Primary | ICD-10-CM

## 2023-10-14 LAB
ALBUMIN SERPL-MCNC: 4.3 G/DL (ref 3.5–5.2)
ALBUMIN/GLOB SERPL: 1.7 (ref 1.1–2.2)
ALP SERPL-CCNC: 62 U/L (ref 35–104)
ALT SERPL-CCNC: 6 U/L (ref 10–35)
AMORPH CRY URNS QL MICRO: ABNORMAL
ANION GAP SERPL CALC-SCNC: 10 MMOL/L (ref 5–15)
APPEARANCE UR: CLEAR
AST SERPL-CCNC: 14 U/L (ref 10–35)
BACTERIA URNS QL MICRO: ABNORMAL /HPF
BASOPHILS # BLD: 0.1 K/UL (ref 0–1)
BASOPHILS NFR BLD: 1 % (ref 0–1)
BILIRUB SERPL-MCNC: 0.5 MG/DL (ref 0.2–1)
BILIRUB UR QL: NEGATIVE
BUN SERPL-MCNC: 15 MG/DL (ref 6–20)
BUN/CREAT SERPL: 32 (ref 12–20)
CALCIUM SERPL-MCNC: 8.7 MG/DL (ref 8.6–10)
CHLORIDE SERPL-SCNC: 106 MMOL/L (ref 98–107)
CO2 SERPL-SCNC: 22 MMOL/L (ref 22–29)
COLOR UR: ABNORMAL
CREAT SERPL-MCNC: 0.47 MG/DL (ref 0.5–0.9)
DIFFERENTIAL METHOD BLD: ABNORMAL
EOSINOPHIL # BLD: 0.1 K/UL (ref 0–0.4)
EOSINOPHIL NFR BLD: 1 %
EPITH CASTS URNS QL MICRO: ABNORMAL /LPF
ERYTHROCYTE [DISTWIDTH] IN BLOOD BY AUTOMATED COUNT: 13.7 % (ref 11.5–14.5)
GLOBULIN SER CALC-MCNC: 2.5 G/DL (ref 2–4)
GLUCOSE SERPL-MCNC: 93 MG/DL (ref 65–100)
GLUCOSE UR STRIP.AUTO-MCNC: NEGATIVE MG/DL
HCG UR QL: NEGATIVE
HCT VFR BLD AUTO: 38.3 % (ref 35–47)
HGB BLD-MCNC: 13.6 G/DL (ref 11.5–16)
HGB UR QL STRIP: NEGATIVE
IMM GRANULOCYTES # BLD AUTO: 0 K/UL (ref 0–0.04)
IMM GRANULOCYTES NFR BLD AUTO: 0 % (ref 0–0.5)
KETONES UR QL STRIP.AUTO: NEGATIVE MG/DL
LEUKOCYTE ESTERASE UR QL STRIP.AUTO: NEGATIVE
LYMPHOCYTES # BLD: 3.5 K/UL (ref 0.8–3.5)
LYMPHOCYTES NFR BLD: 33 % (ref 12–49)
MAGNESIUM SERPL-MCNC: 1.9 MG/DL (ref 1.6–2.6)
MCH RBC QN AUTO: 30 PG (ref 26–34)
MCHC RBC AUTO-ENTMCNC: 35.5 G/DL (ref 30–36.5)
MCV RBC AUTO: 84.5 FL (ref 80–99)
MONOCYTES # BLD: 0.6 K/UL (ref 0–1)
MONOCYTES NFR BLD: 6 % (ref 5–13)
NEUTS SEG # BLD: 6.4 K/UL (ref 1.8–8)
NEUTS SEG NFR BLD: 59 % (ref 32–75)
NITRITE UR QL STRIP.AUTO: NEGATIVE
NRBC # BLD: 0 K/UL (ref 0–0.01)
NRBC BLD-RTO: 0 PER 100 WBC
PH UR STRIP: 7.5 (ref 5–8)
PLATELET # BLD AUTO: 228 K/UL (ref 150–400)
PMV BLD AUTO: 10.5 FL (ref 8.9–12.9)
POTASSIUM SERPL-SCNC: 3.8 MMOL/L (ref 3.5–5.1)
PROT SERPL-MCNC: 6.8 G/DL (ref 6.4–8.3)
PROT UR STRIP-MCNC: NEGATIVE MG/DL
RBC # BLD AUTO: 4.53 M/UL (ref 3.8–5.2)
RBC #/AREA URNS HPF: ABNORMAL /HPF
SODIUM SERPL-SCNC: 138 MMOL/L (ref 136–145)
SP GR UR REFRACTOMETRY: 1.01 (ref 1–1.03)
SPECIMEN HOLD: NORMAL
UROBILINOGEN UR QL STRIP.AUTO: 0.2 EU/DL (ref 0.2–1)
WBC # BLD AUTO: 10.8 K/UL (ref 3.6–11)
WBC URNS QL MICRO: ABNORMAL /HPF (ref 0–4)

## 2023-10-14 PROCEDURE — 6360000002 HC RX W HCPCS

## 2023-10-14 PROCEDURE — 2580000003 HC RX 258

## 2023-10-14 PROCEDURE — 36415 COLL VENOUS BLD VENIPUNCTURE: CPT

## 2023-10-14 PROCEDURE — 99284 EMERGENCY DEPT VISIT MOD MDM: CPT

## 2023-10-14 PROCEDURE — 85025 COMPLETE CBC W/AUTO DIFF WBC: CPT

## 2023-10-14 PROCEDURE — 74176 CT ABD & PELVIS W/O CONTRAST: CPT

## 2023-10-14 PROCEDURE — 96374 THER/PROPH/DIAG INJ IV PUSH: CPT

## 2023-10-14 PROCEDURE — 83735 ASSAY OF MAGNESIUM: CPT

## 2023-10-14 PROCEDURE — 81001 URINALYSIS AUTO W/SCOPE: CPT

## 2023-10-14 PROCEDURE — 81025 URINE PREGNANCY TEST: CPT

## 2023-10-14 PROCEDURE — 80053 COMPREHEN METABOLIC PANEL: CPT

## 2023-10-14 PROCEDURE — 96375 TX/PRO/DX INJ NEW DRUG ADDON: CPT

## 2023-10-14 PROCEDURE — 96361 HYDRATE IV INFUSION ADD-ON: CPT

## 2023-10-14 RX ORDER — ONDANSETRON 2 MG/ML
4 INJECTION INTRAMUSCULAR; INTRAVENOUS ONCE
Status: COMPLETED | OUTPATIENT
Start: 2023-10-14 | End: 2023-10-14

## 2023-10-14 RX ORDER — CEPHALEXIN 250 MG/1
500 CAPSULE ORAL ONCE
Status: DISCONTINUED | OUTPATIENT
Start: 2023-10-14 | End: 2023-10-14

## 2023-10-14 RX ORDER — 0.9 % SODIUM CHLORIDE 0.9 %
1000 INTRAVENOUS SOLUTION INTRAVENOUS ONCE
Status: COMPLETED | OUTPATIENT
Start: 2023-10-14 | End: 2023-10-14

## 2023-10-14 RX ORDER — KETOROLAC TROMETHAMINE 30 MG/ML
30 INJECTION, SOLUTION INTRAMUSCULAR; INTRAVENOUS ONCE
Status: COMPLETED | OUTPATIENT
Start: 2023-10-14 | End: 2023-10-14

## 2023-10-14 RX ORDER — DIPHENHYDRAMINE HYDROCHLORIDE 50 MG/ML
10 INJECTION INTRAMUSCULAR; INTRAVENOUS
Status: COMPLETED | OUTPATIENT
Start: 2023-10-14 | End: 2023-10-14

## 2023-10-14 RX ORDER — DEXAMETHASONE SODIUM PHOSPHATE 4 MG/ML
4 INJECTION, SOLUTION INTRA-ARTICULAR; INTRALESIONAL; INTRAMUSCULAR; INTRAVENOUS; SOFT TISSUE ONCE
Status: COMPLETED | OUTPATIENT
Start: 2023-10-14 | End: 2023-10-14

## 2023-10-14 RX ADMIN — DEXAMETHASONE SODIUM PHOSPHATE 4 MG: 4 INJECTION INTRA-ARTICULAR; INTRALESIONAL; INTRAMUSCULAR; INTRAVENOUS; SOFT TISSUE at 21:57

## 2023-10-14 RX ADMIN — ONDANSETRON 4 MG: 2 INJECTION INTRAMUSCULAR; INTRAVENOUS at 20:50

## 2023-10-14 RX ADMIN — KETOROLAC TROMETHAMINE 30 MG: 30 INJECTION, SOLUTION INTRAMUSCULAR; INTRAVENOUS at 20:50

## 2023-10-14 RX ADMIN — SODIUM CHLORIDE 1000 ML: 9 INJECTION, SOLUTION INTRAVENOUS at 20:50

## 2023-10-14 RX ADMIN — DIPHENHYDRAMINE HYDROCHLORIDE 10 MG: 50 INJECTION INTRAMUSCULAR; INTRAVENOUS at 21:55

## 2023-10-14 ASSESSMENT — PAIN DESCRIPTION - LOCATION
LOCATION: ABDOMEN
LOCATION: ABDOMEN

## 2023-10-14 ASSESSMENT — LIFESTYLE VARIABLES
HOW MANY STANDARD DRINKS CONTAINING ALCOHOL DO YOU HAVE ON A TYPICAL DAY: PATIENT DOES NOT DRINK
HOW OFTEN DO YOU HAVE A DRINK CONTAINING ALCOHOL: NEVER

## 2023-10-14 ASSESSMENT — PAIN DESCRIPTION - ORIENTATION
ORIENTATION: RIGHT;LOWER
ORIENTATION: RIGHT;LOWER

## 2023-10-14 ASSESSMENT — PAIN DESCRIPTION - DESCRIPTORS
DESCRIPTORS: BURNING;ACHING
DESCRIPTORS: DISCOMFORT

## 2023-10-14 ASSESSMENT — PAIN DESCRIPTION - ONSET
ONSET: ON-GOING
ONSET: ON-GOING

## 2023-10-14 ASSESSMENT — PAIN DESCRIPTION - PAIN TYPE
TYPE: ACUTE PAIN
TYPE: ACUTE PAIN

## 2023-10-14 ASSESSMENT — PAIN SCALES - GENERAL
PAINLEVEL_OUTOF10: 6
PAINLEVEL_OUTOF10: 2
PAINLEVEL_OUTOF10: 9

## 2023-10-14 ASSESSMENT — PAIN - FUNCTIONAL ASSESSMENT: PAIN_FUNCTIONAL_ASSESSMENT: 0-10

## 2023-10-14 ASSESSMENT — PAIN DESCRIPTION - FREQUENCY
FREQUENCY: CONTINUOUS
FREQUENCY: CONTINUOUS

## 2023-10-15 ASSESSMENT — ENCOUNTER SYMPTOMS
NAUSEA: 1
DIARRHEA: 0
VOMITING: 0
ABDOMINAL PAIN: 1

## 2023-10-15 NOTE — ED TRIAGE NOTES
Pt in ED with c/o right flank pain since 11am today. Pt said it feels like an achy pressure, and she is nauseous.

## 2023-10-15 NOTE — DISCHARGE INSTRUCTIONS
You have been seen tonight for evaluation of flank and abdominal pain. Your CT scan did not show evidence of infection, mass, obstruction or other concerns. 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 Additionally, your labs were reassuring. I encourage you to contact the GI provider on this paperwork for further evaluation of your symptoms. Should your symptoms fail to improve or worsen please present back to this department for re-evaluation of symptoms.

## 2023-10-16 NOTE — ED PROVIDER NOTES
No wheezes. Airway is patent. She is speaking in clear, full sentences. No airway edema or compromise. However, she is noticeably congested now with dry cough. Will give benadryl and decadron to assist with likely allergic reaction. []   [id number] Initially had CT abdomen study ordered with contrast. When CT tech came to get patient, she stated, \"I'm not getting that contrast!\". I went over to chat with patient about the benefits of contrast and how we use it to fully evaluate for infection. She states, \"I don't care if something is missed, I don't want the contrast, last time I had it it made me feel funny\". Pt unable to clarify if she received CT contrast or MRI contrast and felt this way. Will proceed with non-contrast study. []      ED Course User Index  [] Michelle Camacho PA-C           CONSULTS:  None    PROCEDURES:  Unless otherwise noted below, none     Procedures      FINAL IMPRESSION      1. Right flank pain    2. Right lower quadrant abdominal pain    3.  Acute cystitis without hematuria          DISPOSITION/PLAN   DISPOSITION Decision To Discharge 10/14/2023 11:01:19 PM      PATIENT REFERRED TO:  DAMASO Reid NP  [address] [place] [address]  [phone]    Schedule an appointment as soon as possible for a visit in 1 week      [facility] EMERGENCY DEPT  [address] [facility]  [phone]  Go to   If symptoms worsen    Tate Kruger MD  [address] [place] [address]  [phone]    Call today  for GI evaluation      DISCHARGE MEDICATIONS:  Discharge Medication List as of 10/14/2023 11:16 PM            (Please note that portions of this note were completed with a voice recognition program.  Efforts were made to edit the dictations but occasionally words are mis-transcribed.)    Michelle Camacho PA-C (electronically signed)  Emergency Attending Physician / Physician Assistant / Nurse Practitioner             Michelle Camacho PA-C  10/15/23 9915

## 2023-11-13 ENCOUNTER — OFFICE VISIT (OUTPATIENT)
Facility: CLINIC | Age: 33
End: 2023-11-13
Payer: COMMERCIAL

## 2023-11-13 VITALS
HEART RATE: 84 BPM | BODY MASS INDEX: 21.51 KG/M2 | OXYGEN SATURATION: 98 % | HEIGHT: 64 IN | TEMPERATURE: 98.2 F | WEIGHT: 126 LBS | SYSTOLIC BLOOD PRESSURE: 92 MMHG | RESPIRATION RATE: 16 BRPM | DIASTOLIC BLOOD PRESSURE: 68 MMHG

## 2023-11-13 DIAGNOSIS — Q62.8 CONGENITAL ABNORMALITY OF URETER: ICD-10-CM

## 2023-11-13 DIAGNOSIS — Z09 HOSPITAL DISCHARGE FOLLOW-UP: Primary | ICD-10-CM

## 2023-11-13 DIAGNOSIS — Z87.898 HISTORY OF NAUSEA AND VOMITING: ICD-10-CM

## 2023-11-13 DIAGNOSIS — Z87.898 HISTORY OF ABDOMINAL PAIN: ICD-10-CM

## 2023-11-13 PROCEDURE — 99214 OFFICE O/P EST MOD 30 MIN: CPT | Performed by: NURSE PRACTITIONER

## 2023-11-13 RX ORDER — BUTALBITAL, ACETAMINOPHEN AND CAFFEINE 300; 40; 50 MG/1; MG/1; MG/1
1 CAPSULE ORAL EVERY 4 HOURS PRN
Qty: 84 CAPSULE | Refills: 1 | Status: SHIPPED | OUTPATIENT
Start: 2023-11-13

## 2023-11-13 RX ORDER — PROPRANOLOL HYDROCHLORIDE 20 MG/1
TABLET ORAL
Qty: 90 TABLET | Refills: 1 | Status: SHIPPED | OUTPATIENT
Start: 2023-11-13

## 2023-11-13 SDOH — ECONOMIC STABILITY: INCOME INSECURITY: HOW HARD IS IT FOR YOU TO PAY FOR THE VERY BASICS LIKE FOOD, HOUSING, MEDICAL CARE, AND HEATING?: NOT HARD AT ALL

## 2023-11-13 SDOH — ECONOMIC STABILITY: FOOD INSECURITY: WITHIN THE PAST 12 MONTHS, THE FOOD YOU BOUGHT JUST DIDN'T LAST AND YOU DIDN'T HAVE MONEY TO GET MORE.: NEVER TRUE

## 2023-11-13 SDOH — ECONOMIC STABILITY: FOOD INSECURITY: WITHIN THE PAST 12 MONTHS, YOU WORRIED THAT YOUR FOOD WOULD RUN OUT BEFORE YOU GOT MONEY TO BUY MORE.: NEVER TRUE

## 2023-11-13 SDOH — ECONOMIC STABILITY: HOUSING INSECURITY
IN THE LAST 12 MONTHS, WAS THERE A TIME WHEN YOU DID NOT HAVE A STEADY PLACE TO SLEEP OR SLEPT IN A SHELTER (INCLUDING NOW)?: NO

## 2023-11-13 NOTE — PROGRESS NOTES
Subjective    Chief Complaint   Patient presents with    Follow-Up from Hospital     Had diarrhea, hospital told her she had colitis       HPI:    Edilberto Hilliard is a 28 y.o. female. 41-year-old female presents for hospital discharge follow-up where she presented to the ED on 10/14/2023 with a complaint of right flank pain and nausea that started at about 11 AM that morning. A CT of the abdomen was performed and no acute processes were found. 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 Reviewed ED visit and labs. Patient in need of medication refills        Current Outpatient Medications on File Prior to Visit   Medication Sig Dispense Refill    amitriptyline (ELAVIL) 10 MG tablet Take 2 tablets by mouth nightly 180 tablet 1    Biotin 2.5 MG CAPS Take by mouth      Cholecalciferol 50 MCG (2000 UT) TABS Take by mouth      cyclobenzaprine (FLEXERIL) 10 MG tablet Take one tablet as needed at the onset of migraine      ferrous sulfate (FE TABS 325) 325 (65 Fe) MG EC tablet Take 1 tablet by mouth 3 times daily (with meals)      ferrous sulfate (IRON 325) 325 (65 Fe) MG tablet Take 65 mg by mouth every morning (before breakfast)      ibuprofen (ADVIL;MOTRIN) 800 MG tablet Take by mouth      meclizine (ANTIVERT) 25 MG tablet Take 1 tablet by mouth 3 times daily as needed      Melatonin 5 MG CAPS Take by mouth      ondansetron (ZOFRAN-ODT) 8 MG TBDP disintegrating tablet Take 1 tablet by mouth every 8 hours as needed       No current facility-administered medications on file prior to visit.      Allergies   Allergen Reactions    Toradol [Ketorolac Tromethamine] Itching     Itching to roof of mouth w/dry cough     Aspirin Other (See Comments)     Other reaction(s): Facial Swelling     Review of Systems   Cardiovascular:         History of tachycardia   Endocrine:        Hypoglycemia   Genitourinary:         Hx of right flank pain and incidental finding of right renal

## 2023-11-21 PROBLEM — Z09 HOSPITAL DISCHARGE FOLLOW-UP: Status: ACTIVE | Noted: 2023-11-21

## 2023-11-21 PROBLEM — Q62.8: Status: ACTIVE | Noted: 2023-11-21

## 2023-11-21 PROBLEM — Z87.898 HISTORY OF ABDOMINAL PAIN: Status: ACTIVE | Noted: 2023-11-21

## 2023-11-21 PROBLEM — R00.0 TACHYCARDIA: Status: RESOLVED | Noted: 2022-07-29 | Resolved: 2023-11-21

## 2023-12-07 RX ORDER — BUTALBITAL, ACETAMINOPHEN AND CAFFEINE 300; 40; 50 MG/1; MG/1; MG/1
1 CAPSULE ORAL EVERY 4 HOURS PRN
Qty: 84 CAPSULE | Refills: 1 | Status: SHIPPED | OUTPATIENT
Start: 2023-12-07

## 2023-12-07 NOTE — TELEPHONE ENCOUNTER
Patient called needing a refill for 3 medications. The medications are Amitriptyline 25 mg, Butalbital-APAP-Caffeine -40 mg and Propanolol 20 mg.      Last appt: 11.13.23 with TSS  Next appt: 2.28.24 with TSS    3 [facility] [phone]

## 2023-12-14 ENCOUNTER — TELEPHONE (OUTPATIENT)
Facility: CLINIC | Age: 33
End: 2023-12-14

## 2023-12-14 NOTE — TELEPHONE ENCOUNTER
Called and got pt scheduled. Will be bringing FMLA paperwork to office or sending it through [facility]       ----- Message from [facility] sent at 12/14/2023 11:03 AM EST -----  Subject: Appointment Request    Reason for Call: Established Patient Appointment needed: Routine Existing   Condition Follow Up    QUESTIONS    Reason for appointment request? Available appointments did not meet   patient need     Additional Information for Provider? Patient called in to schedule her a   follow up in regards to her medication and updating her FMLA paperwork. Available appointments does not meet patient needs. Patient would prefer   an in office visit and would like to be seen after Jan 17th but before the   end of February.  Please contact patient to further assist.   ---------------------------------------------------------------------------  --------------  Sera MACIEL  [phone]; OK to leave message on voicemail  ---------------------------------------------------------------------------  --------------  SCRIPT ANSWERS

## 2023-12-21 PROBLEM — Z09 HOSPITAL DISCHARGE FOLLOW-UP: Status: RESOLVED | Noted: 2023-11-21 | Resolved: 2023-12-21

## 2024-01-19 ENCOUNTER — TELEMEDICINE (OUTPATIENT)
Facility: CLINIC | Age: 34
End: 2024-01-19

## 2024-01-19 DIAGNOSIS — Z02.89 ENCOUNTER FOR COMPLETION OF FORM WITH PATIENT: ICD-10-CM

## 2024-01-19 DIAGNOSIS — G43.701 CHRONIC MIGRAINE WITHOUT AURA WITH STATUS MIGRAINOSUS, NOT INTRACTABLE: Primary | ICD-10-CM

## 2024-01-19 RX ORDER — PROMETHAZINE HYDROCHLORIDE 25 MG/1
25 SUPPOSITORY RECTAL EVERY 6 HOURS PRN
Qty: 30 SUPPOSITORY | Refills: 0 | Status: SHIPPED | OUTPATIENT
Start: 2024-01-19 | End: 2024-01-26

## 2024-01-19 ASSESSMENT — PATIENT HEALTH QUESTIONNAIRE - PHQ9
SUM OF ALL RESPONSES TO PHQ9 QUESTIONS 1 & 2: 0
SUM OF ALL RESPONSES TO PHQ QUESTIONS 1-9: 0
SUM OF ALL RESPONSES TO PHQ QUESTIONS 1-9: 0
2. FEELING DOWN, DEPRESSED OR HOPELESS: 0
1. LITTLE INTEREST OR PLEASURE IN DOING THINGS: 0
SUM OF ALL RESPONSES TO PHQ QUESTIONS 1-9: 0
SUM OF ALL RESPONSES TO PHQ QUESTIONS 1-9: 0

## 2024-01-19 NOTE — PROGRESS NOTES
Chief Complaint   Patient presents with    FMLA     For Migraines.      PHQ-9 Total Score: 0 (1/19/2024 11:39 AM)    \"Have you been to the ER, urgent care clinic since your last visit?  Hospitalized since your last visit?\"    NO    “Have you seen or consulted any other health care providers outside of [facility] since your last visit?”    NO        “Have you had a pap smear?”    NO           There were no vitals taken for this visit.       No data to display

## 2024-01-28 PROBLEM — Z02.89 ENCOUNTER FOR COMPLETION OF FORM WITH PATIENT: Status: ACTIVE | Noted: 2022-05-20

## 2024-01-28 RX ORDER — ONDANSETRON 8 MG/1
8 TABLET, ORALLY DISINTEGRATING ORAL EVERY 8 HOURS PRN
Qty: 90 TABLET | Refills: 0 | Status: SHIPPED | OUTPATIENT
Start: 2024-01-28

## 2024-02-02 ENCOUNTER — TELEPHONE (OUTPATIENT)
Facility: CLINIC | Age: 34
End: 2024-02-02

## 2024-02-02 NOTE — TELEPHONE ENCOUNTER
Patient called in regards to an= FMLA form she brought into the office on 1/19/24. She would like to know when that would be filled out. Please call patient at [phone].

## 2024-02-07 NOTE — TELEPHONE ENCOUNTER
Called patient to tell her paperwork was done, but could not leave message for patient to call back

## 2024-02-27 ENCOUNTER — TELEPHONE (OUTPATIENT)
Facility: CLINIC | Age: 34
End: 2024-02-27

## 2024-02-27 NOTE — TELEPHONE ENCOUNTER
Pt requesting a neurology referral. Pt stated she hasn't seen them in over a year and it would have to do with her headaches

## 2024-02-27 NOTE — TELEPHONE ENCOUNTER
----- Message from Romie Dailey sent at 2/27/2024  2:21 PM EST -----  Subject: Referral Request    Reason for referral request? pt. Ying Cole is requesting referrals to   neurology and gastroenterology that are in network w/[facility], please   follow up w/pt. to further assist  Provider patient wants to be referred to(if known):     Provider Phone Number(if known):    Additional Information for Provider?   ---------------------------------------------------------------------------  --------------  CALL BACK INFO    [phone]; OK to leave message on voicemail,OK to respond with electronic   message via [employer] portal (only for patients who have registered [employer]   account)  ---------------------------------------------------------------------------  --------------

## 2024-02-28 NOTE — TELEPHONE ENCOUNTER
Called patient to discuss referral for neurology and patient stated she needed gastro as well. Patient was instructed to call insurance company to see who is in network as she had inquired about that. Patient has wellness set up with you in pay. Patient wanted to be seen earlier due to cardiologist saying she needs to be off some medication and patient stated she wants to talk about getting labs done as well. I scheduled patient a VV on 3/15 to discuss meds and labs, by then patient should have called insurance and we will be able to put referrals in.    Just FYI for appt.

## 2024-03-15 ENCOUNTER — TELEMEDICINE (OUTPATIENT)
Facility: CLINIC | Age: 34
End: 2024-03-15
Payer: COMMERCIAL

## 2024-03-15 DIAGNOSIS — R11.0 NAUSEA: ICD-10-CM

## 2024-03-15 DIAGNOSIS — G43.009 MIGRAINE WITHOUT AURA AND WITHOUT STATUS MIGRAINOSUS, NOT INTRACTABLE: Primary | ICD-10-CM

## 2024-03-15 PROCEDURE — 99213 OFFICE O/P EST LOW 20 MIN: CPT | Performed by: FAMILY MEDICINE

## 2024-03-15 RX ORDER — PROMETHAZINE HYDROCHLORIDE 25 MG/1
25 SUPPOSITORY RECTAL EVERY 6 HOURS PRN
Qty: 30 SUPPOSITORY | Refills: 0 | Status: SHIPPED | OUTPATIENT
Start: 2024-03-15
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
## 2024-05-22 ENCOUNTER — OFFICE VISIT (OUTPATIENT)
Facility: CLINIC | Age: 34
End: 2024-05-22
Payer: COMMERCIAL

## 2024-05-22 VITALS
OXYGEN SATURATION: 98 % | TEMPERATURE: 97.7 F | DIASTOLIC BLOOD PRESSURE: 62 MMHG | BODY MASS INDEX: 22.09 KG/M2 | HEIGHT: 64 IN | WEIGHT: 129.4 LBS | SYSTOLIC BLOOD PRESSURE: 110 MMHG | HEART RATE: 78 BPM

## 2024-05-22 DIAGNOSIS — M62.838 MUSCLE SPASM: Primary | ICD-10-CM

## 2024-05-22 DIAGNOSIS — G43.009 MIGRAINE WITHOUT AURA AND WITHOUT STATUS MIGRAINOSUS, NOT INTRACTABLE: ICD-10-CM

## 2024-05-22 DIAGNOSIS — Z83.49 FAMILY HISTORY OF THYROID DISEASE: ICD-10-CM

## 2024-05-22 DIAGNOSIS — Z13.228 SCREENING FOR METABOLIC DISORDER: ICD-10-CM

## 2024-05-22 DIAGNOSIS — Z13.220 ENCOUNTER FOR LIPID SCREENING FOR CARDIOVASCULAR DISEASE: ICD-10-CM

## 2024-05-22 DIAGNOSIS — Z83.3 FAMILY HISTORY OF DIABETES MELLITUS: ICD-10-CM

## 2024-05-22 DIAGNOSIS — Z23 NEED FOR HPV VACCINE: ICD-10-CM

## 2024-05-22 DIAGNOSIS — Z13.6 ENCOUNTER FOR LIPID SCREENING FOR CARDIOVASCULAR DISEASE: ICD-10-CM

## 2024-05-22 DIAGNOSIS — Z00.00 WELL ADULT EXAM: Primary | ICD-10-CM

## 2024-05-22 DIAGNOSIS — R11.0 NAUSEA: ICD-10-CM

## 2024-05-22 PROBLEM — F39 MOOD DISORDER (HCC): Status: RESOLVED | Noted: 2023-01-11 | Resolved: 2024-05-22

## 2024-05-22 PROCEDURE — 90651 9VHPV VACCINE 2/3 DOSE IM: CPT | Performed by: FAMILY MEDICINE

## 2024-05-22 PROCEDURE — 90471 IMMUNIZATION ADMIN: CPT | Performed by: FAMILY MEDICINE

## 2024-05-22 PROCEDURE — 99395 PREV VISIT EST AGE 18-39: CPT | Performed by: FAMILY MEDICINE

## 2024-05-22 RX ORDER — CYCLOBENZAPRINE HCL 5 MG
5 TABLET ORAL 2 TIMES DAILY PRN
Qty: 10 TABLET | Refills: 0 | Status: SHIPPED | OUTPATIENT
Start: 2024-05-22 | End: 2024-06-01

## 2024-05-22 RX ORDER — PROMETHAZINE HYDROCHLORIDE 25 MG/1
25 SUPPOSITORY RECTAL EVERY 6 HOURS PRN
Qty: 10 SUPPOSITORY | Refills: 0 | Status: SHIPPED | OUTPATIENT
Start: 2024-05-22

## 2024-05-22 ASSESSMENT — PATIENT HEALTH QUESTIONNAIRE - PHQ9
1. LITTLE INTEREST OR PLEASURE IN DOING THINGS: NOT AT ALL
SUM OF ALL RESPONSES TO PHQ QUESTIONS 1-9: 0
SUM OF ALL RESPONSES TO PHQ QUESTIONS 1-9: 0
SUM OF ALL RESPONSES TO PHQ9 QUESTIONS 1 & 2: 0
2. FEELING DOWN, DEPRESSED OR HOPELESS: NOT AT ALL
SUM OF ALL RESPONSES TO PHQ QUESTIONS 1-9: 0
SUM OF ALL RESPONSES TO PHQ QUESTIONS 1-9: 0

## 2024-05-22 NOTE — PROGRESS NOTES
[facility]      HPI: Pt is a 34 y.o. female who presents for wellness.     HM:  Hep B vaccine: She is pretty sure she had these  COVID booster: Pt advised they can get this at pharmacy  Varicella vaccine: She is unsure if she has had this  HPV vaccine: Got at least 2 doses  Pap: [facility], 2-3 years ago, has been normal  TDaP: 2019  HCV Ab: 2022, negative  HIV screen: Due      Past Medical History:   Diagnosis Date    Herpes genitalia     Kidney stones     Migraines     UTI (urinary tract infection)     Vitamin D deficiency        Family History   Adopted: Yes   Problem Relation Age of Onset    No Known Problems Mother     No Known Problems Father     Hypertension Sister     Heart Disease Sister        Social History     Tobacco Use    Smoking status: Never    Smokeless tobacco: Never   Substance Use Topics    Alcohol use: Not Currently    Drug use: Never       ROS:  Per HPI    PE:  /62 (Site: Left Upper Arm, Position: Sitting)   Pulse 78   Temp 97.7 °F (36.5 °C) (Temporal)   Ht 1.626 m (5' 4\")   Wt 58.7 kg (129 lb 6.4 oz)   SpO2 98%   BMI 22.21 kg/m²   Gen: Pt sitting in chair, in NAD  Head: Normocephalic, atraumatic  Eyes: Sclera anicteric, EOM grossly intact, PERRL  Ears: TM's pearly with good light reflex b/l  Nose: Normal nasal mucosa  Throat: MMM, normal lips, tongue, teeth and gums  Neck: Supple, no LAD, no thyromegaly or carotid bruits  CVS: Normal S1, S2, no m/r/g  Resp: CTAB, no wheezes or rales  Extrem: Atraumatic, no cyanosis or edema  Pulses: 2+   Skin: Warm, dry  Neuro: Alert, oriented, appropriate      A/P:     ICD-10-CM    1. Well adult exam  Z00.00 CBC with Auto Differential      2. Family history of diabetes mellitus  Z83.3 Hemoglobin A1C      3. Encounter for lipid screening for cardiovascular disease  Z13.220 Lipid Panel    Z13.6       4. Screening for metabolic disorder  Z13.228 Comprehensive Metabolic Panel      5. Family history of thyroid

## 2024-05-22 NOTE — TELEPHONE ENCOUNTER
Phenergan is for the nausea associated with migraines.  The Flexeril she only uses PRN at onset of migraine if it affects her neck muscles.

## 2024-05-22 NOTE — TELEPHONE ENCOUNTER
The phenergan I'm guessing is for when she has nausea for a migraine? I don't recommend flexeril for chronic use. What does she use this for?    Thanks!

## 2024-05-22 NOTE — PROGRESS NOTES
Chief Complaint   Patient presents with    Annual Exam       \"Have you been to the ER, urgent care clinic since your last visit?  Hospitalized since your last visit?\"    YES - When: approximately 3 months ago.  [facility] and Why: [facility] for [facility].    “Have you seen or consulted any other health care providers outside of [facility] since your last visit?”    NO     “Have you had a pap smear?”    NO    No cervical cancer screening on file             Click Here for Release of Records Request    PHQ-9 Total Score: 0 (5/22/2024  7:13 AM)

## 2024-05-22 NOTE — TELEPHONE ENCOUNTER
As patient was checking out she forgot to mention to MWE about refills for promethazine and muscle relaxer (not seen in chart). Patient tried to  promethazine last time and pharmacy had canceled it.

## 2024-05-23 ENCOUNTER — TELEPHONE (OUTPATIENT)
Facility: CLINIC | Age: 34
End: 2024-05-23

## 2024-05-23 LAB
ALBUMIN SERPL-MCNC: 4.4 G/DL (ref 3.9–4.9)
ALBUMIN/GLOB SERPL: 1.7 {RATIO} (ref 1.2–2.2)
ALP SERPL-CCNC: 71 IU/L (ref 44–121)
ALT SERPL-CCNC: 13 IU/L (ref 0–32)
AST SERPL-CCNC: 13 IU/L (ref 0–40)
BASOPHILS # BLD AUTO: 0 X10E3/UL (ref 0–0.2)
BASOPHILS NFR BLD AUTO: 1 %
BILIRUB SERPL-MCNC: 0.5 MG/DL (ref 0–1.2)
BUN SERPL-MCNC: 11 MG/DL (ref 6–20)
BUN/CREAT SERPL: 21 (ref 9–23)
CALCIUM SERPL-MCNC: 8.6 MG/DL (ref 8.7–10.2)
CHLORIDE SERPL-SCNC: 106 MMOL/L (ref 96–106)
CHOLEST SERPL-MCNC: 180 MG/DL (ref 100–199)
CO2 SERPL-SCNC: 21 MMOL/L (ref 20–29)
CREAT SERPL-MCNC: 0.52 MG/DL (ref 0.57–1)
EGFRCR SERPLBLD CKD-EPI 2021: 124 ML/MIN/1.73
EOSINOPHIL # BLD AUTO: 0.1 X10E3/UL (ref 0–0.4)
EOSINOPHIL NFR BLD AUTO: 2 %
ERYTHROCYTE [DISTWIDTH] IN BLOOD BY AUTOMATED COUNT: 13.3 % (ref 11.7–15.4)
GLOBULIN SER CALC-MCNC: 2.6 G/DL (ref 1.5–4.5)
GLUCOSE SERPL-MCNC: 79 MG/DL (ref 70–99)
HBA1C MFR BLD: 5 % (ref 4.8–5.6)
HCT VFR BLD AUTO: 42.6 % (ref 34–46.6)
HDLC SERPL-MCNC: 71 MG/DL
HGB BLD-MCNC: 14.3 G/DL (ref 11.1–15.9)
IMM GRANULOCYTES # BLD AUTO: 0 X10E3/UL (ref 0–0.1)
IMM GRANULOCYTES NFR BLD AUTO: 0 %
LDLC SERPL CALC-MCNC: 99 MG/DL (ref 0–99)
LYMPHOCYTES # BLD AUTO: 2.7 X10E3/UL (ref 0.7–3.1)
LYMPHOCYTES NFR BLD AUTO: 39 %
MCH RBC QN AUTO: 29.5 PG (ref 26.6–33)
MCHC RBC AUTO-ENTMCNC: 33.6 G/DL (ref 31.5–35.7)
MCV RBC AUTO: 88 FL (ref 79–97)
MONOCYTES # BLD AUTO: 0.5 X10E3/UL (ref 0.1–0.9)
MONOCYTES NFR BLD AUTO: 8 %
NEUTROPHILS # BLD AUTO: 3.5 X10E3/UL (ref 1.4–7)
NEUTROPHILS NFR BLD AUTO: 50 %
PLATELET # BLD AUTO: 256 X10E3/UL (ref 150–450)
POTASSIUM SERPL-SCNC: 4.2 MMOL/L (ref 3.5–5.2)
PROT SERPL-MCNC: 7 G/DL (ref 6–8.5)
RBC # BLD AUTO: 4.85 X10E6/UL (ref 3.77–5.28)
SODIUM SERPL-SCNC: 140 MMOL/L (ref 134–144)
TRIGL SERPL-MCNC: 53 MG/DL (ref 0–149)
TSH SERPL DL<=0.005 MIU/L-ACNC: 1.29 UIU/ML (ref 0.45–4.5)
VLDLC SERPL CALC-MCNC: 10 MG/DL (ref 5–40)
WBC # BLD AUTO: 6.9 X10E3/UL (ref 3.4–10.8)

## 2024-05-23 NOTE — TELEPHONE ENCOUNTER
Patient is requesting a call back to go over lab results she said she received them on Mychart but she want to speak with someone about them.

## 2024-05-23 NOTE — TELEPHONE ENCOUNTER
Patient advised that the doctor has not viewed and commented on her labs yet. Patient advised that as soon as the doctor reads the results she will leave a comment on them and she can view that on her my chart.

## 2024-07-23 ENCOUNTER — APPOINTMENT (OUTPATIENT)
Facility: HOSPITAL | Age: 34
End: 2024-07-23
Payer: COMMERCIAL

## 2024-07-23 ENCOUNTER — HOSPITAL ENCOUNTER (EMERGENCY)
Facility: HOSPITAL | Age: 34
Discharge: HOME | End: 2024-07-23
Attending: EMERGENCY MEDICINE
Payer: COMMERCIAL

## 2024-07-23 VITALS
SYSTOLIC BLOOD PRESSURE: 115 MMHG | BODY MASS INDEX: 21.68 KG/M2 | RESPIRATION RATE: 18 BRPM | DIASTOLIC BLOOD PRESSURE: 83 MMHG | TEMPERATURE: 98.2 F | OXYGEN SATURATION: 99 % | WEIGHT: 127 LBS | HEART RATE: 77 BPM | HEIGHT: 64 IN

## 2024-07-23 DIAGNOSIS — R10.9 ABDOMINAL PAIN, UNSPECIFIED ABDOMINAL LOCATION: Primary | ICD-10-CM

## 2024-07-23 LAB
ALBUMIN SERPL-MCNC: 4.4 G/DL (ref 3.5–5.2)
ALBUMIN/GLOB SERPL: 1.5 (ref 1.1–2.2)
ALP SERPL-CCNC: 76 U/L (ref 35–104)
ALT SERPL-CCNC: 10 U/L (ref 10–35)
ANION GAP SERPL CALC-SCNC: 12 MMOL/L (ref 5–15)
APPEARANCE UR: CLEAR
AST SERPL-CCNC: 17 U/L (ref 10–35)
BACTERIA URNS QL MICRO: NEGATIVE /HPF
BASOPHILS # BLD: 0 K/UL (ref 0–1)
BASOPHILS NFR BLD: 1 % (ref 0–1)
BILIRUB SERPL-MCNC: 0.3 MG/DL (ref 0.2–1)
BILIRUB UR QL: NEGATIVE
BUN SERPL-MCNC: 10 MG/DL (ref 6–20)
BUN/CREAT SERPL: ABNORMAL (ref 12–20)
CALCIUM SERPL-MCNC: 9 MG/DL (ref 8.6–10)
CHLORIDE SERPL-SCNC: 105 MMOL/L (ref 98–107)
CO2 SERPL-SCNC: 23 MMOL/L (ref 22–29)
COLOR UR: NORMAL
CREAT SERPL-MCNC: <0.47 MG/DL (ref 0.5–0.9)
DIFFERENTIAL METHOD BLD: ABNORMAL
EOSINOPHIL # BLD: 0.1 K/UL (ref 0–0.4)
EOSINOPHIL NFR BLD: 2 %
EPITH CASTS URNS QL MICRO: NORMAL /LPF
ERYTHROCYTE [DISTWIDTH] IN BLOOD BY AUTOMATED COUNT: 14 % (ref 11.5–14.5)
GLOBULIN SER CALC-MCNC: 2.9 G/DL (ref 2–4)
GLUCOSE SERPL-MCNC: 89 MG/DL (ref 65–100)
GLUCOSE UR STRIP.AUTO-MCNC: NEGATIVE MG/DL
HCT VFR BLD AUTO: 39.6 % (ref 35–47)
HGB BLD-MCNC: 13.9 G/DL (ref 11.5–16)
HGB UR QL STRIP: NEGATIVE
IMM GRANULOCYTES # BLD AUTO: 0 K/UL (ref 0–0.04)
IMM GRANULOCYTES NFR BLD AUTO: 0 % (ref 0–0.5)
KETONES UR QL STRIP.AUTO: NEGATIVE MG/DL
LEUKOCYTE ESTERASE UR QL STRIP.AUTO: NEGATIVE
LIPASE SERPL-CCNC: 20 U/L (ref 13–60)
LYMPHOCYTES # BLD: 2.6 K/UL (ref 0.8–3.5)
LYMPHOCYTES NFR BLD: 41 % (ref 12–49)
MCH RBC QN AUTO: 29 PG (ref 26–34)
MCHC RBC AUTO-ENTMCNC: 35.1 G/DL (ref 30–36.5)
MCV RBC AUTO: 82.5 FL (ref 80–99)
MONOCYTES # BLD: 0.4 K/UL (ref 0–1)
MONOCYTES NFR BLD: 6 % (ref 5–13)
NEUTS SEG # BLD: 3.2 K/UL (ref 1.8–8)
NEUTS SEG NFR BLD: 50 % (ref 32–75)
NITRITE UR QL STRIP.AUTO: NEGATIVE
NRBC # BLD: 0 K/UL (ref 0–0.01)
NRBC BLD-RTO: 0 PER 100 WBC
PH UR STRIP: 5.5 (ref 5–8)
PLATELET # BLD AUTO: 255 K/UL (ref 150–400)
PMV BLD AUTO: 10.2 FL (ref 8.9–12.9)
POTASSIUM SERPL-SCNC: 4.2 MMOL/L (ref 3.5–5.1)
PROT SERPL-MCNC: 7.3 G/DL (ref 6.4–8.3)
PROT UR STRIP-MCNC: NEGATIVE MG/DL
RBC # BLD AUTO: 4.8 M/UL (ref 3.8–5.2)
RBC #/AREA URNS HPF: NORMAL /HPF
SODIUM SERPL-SCNC: 140 MMOL/L (ref 136–145)
SP GR UR REFRACTOMETRY: 1.01 (ref 1–1.03)
URINE CULTURE IF INDICATED: NORMAL
UROBILINOGEN UR QL STRIP.AUTO: 0.2 EU/DL (ref 0.2–1)
WBC # BLD AUTO: 6.3 K/UL (ref 3.6–11)
WBC URNS QL MICRO: NORMAL /HPF (ref 0–4)

## 2024-07-23 PROCEDURE — 36415 COLL VENOUS BLD VENIPUNCTURE: CPT

## 2024-07-23 PROCEDURE — 6360000002 HC RX W HCPCS: Performed by: EMERGENCY MEDICINE

## 2024-07-23 PROCEDURE — 6370000000 HC RX 637 (ALT 250 FOR IP): Performed by: EMERGENCY MEDICINE

## 2024-07-23 PROCEDURE — 99284 EMERGENCY DEPT VISIT MOD MDM: CPT

## 2024-07-23 PROCEDURE — 85025 COMPLETE CBC W/AUTO DIFF WBC: CPT

## 2024-07-23 PROCEDURE — 76705 ECHO EXAM OF ABDOMEN: CPT

## 2024-07-23 PROCEDURE — 96374 THER/PROPH/DIAG INJ IV PUSH: CPT

## 2024-07-23 PROCEDURE — 81001 URINALYSIS AUTO W/SCOPE: CPT

## 2024-07-23 PROCEDURE — 83690 ASSAY OF LIPASE: CPT

## 2024-07-23 PROCEDURE — 80053 COMPREHEN METABOLIC PANEL: CPT

## 2024-07-23 RX ORDER — ONDANSETRON 2 MG/ML
4 INJECTION INTRAMUSCULAR; INTRAVENOUS ONCE
Status: COMPLETED | OUTPATIENT
Start: 2024-07-23 | End: 2024-07-23

## 2024-07-23 RX ORDER — DICYCLOMINE HYDROCHLORIDE 10 MG/1
10 CAPSULE ORAL 3 TIMES DAILY PRN
Qty: 21 CAPSULE | Refills: 0 | Status: SHIPPED | OUTPATIENT
Start: 2024-07-23 | End: 2024-07-30

## 2024-07-23 RX ORDER — DICYCLOMINE HCL 20 MG
20 TABLET ORAL
Status: COMPLETED | OUTPATIENT
Start: 2024-07-23 | End: 2024-07-23

## 2024-07-23 RX ADMIN — ONDANSETRON 4 MG: 2 INJECTION INTRAMUSCULAR; INTRAVENOUS at 10:01

## 2024-07-23 RX ADMIN — DICYCLOMINE HYDROCHLORIDE 20 MG: 20 TABLET ORAL at 10:01

## 2024-07-23 ASSESSMENT — ENCOUNTER SYMPTOMS
CONSTIPATION: 0
FLATUS: 1
SHORTNESS OF BREATH: 0
ABDOMINAL PAIN: 1
BELCHING: 1
VOMITING: 1
NAUSEA: 1
COLOR CHANGE: 0
DIARRHEA: 0
BACK PAIN: 0

## 2024-07-23 NOTE — ED PROVIDER NOTES
Financial Resource Strain (CARDIA)     Difficulty of Paying Living Expenses: Not hard at all   Transportation Needs: Unknown (11/13/2023)    PRAPARE - Transportation     Lack of Transportation (Non-Medical): No   Housing Stability: Unknown (11/13/2023)    Housing Stability Vital Sign     Unstable Housing in the Last Year: No       SCREENINGS                               CIWA Assessment  BP: 115/83  Pulse: 77                 PHYSICAL EXAM    (up to 7 for level 4, 8 or more for level 5)     ED Triage Vitals [07/23/24 0937]   BP Temp Temp Source Pulse Respirations SpO2 Height Weight - Scale   115/83 98.2 °F (36.8 °C) Oral 77 18 99 % 1.626 m (5' 4\") 57.6 kg (127 lb)       Physical Exam  Vitals and nursing note reviewed.   Constitutional:       General: She is not in acute distress.     Appearance: Normal appearance. She is not ill-appearing, toxic-appearing or diaphoretic.   HENT:      Head: Normocephalic and atraumatic.      Nose: Nose normal.      Mouth/Throat:      Mouth: Mucous membranes are moist.   Eyes:      Extraocular Movements: Extraocular movements intact.      Conjunctiva/sclera: Conjunctivae normal.      Pupils: Pupils are equal, round, and reactive to light.   Cardiovascular:      Comments: Warm and well perfused  Pulmonary:      Effort: Pulmonary effort is normal.   Abdominal:      General: Abdomen is flat.      Palpations: Abdomen is soft.      Tenderness: There is no abdominal tenderness.   Musculoskeletal:         General: Normal range of motion.      Cervical back: Normal range of motion.   Skin:     General: Skin is warm and dry.   Neurological:      General: No focal deficit present.      Mental Status: She is alert and oriented to person, place, and time.   Psychiatric:         Mood and Affect: Mood normal.         Behavior: Behavior normal.         Thought Content: Thought content normal.         Judgment: Judgment normal.         DIAGNOSTIC RESULTS     EKG: All EKG's are interpreted by the

## 2024-07-23 NOTE — ED TRIAGE NOTES
Patient arrives c/o RUQ abdominal pain x1 year. Reporting new belching, diarrhea, vomiting, abdominal cramping since yesterday. Pain worse with movement. Denies fever, dysuria, back pain.

## 2024-08-08 PROBLEM — G47.00 INSOMNIA: Status: ACTIVE | Noted: 2024-08-08

## 2024-08-08 PROBLEM — F43.10 POST-TRAUMATIC STRESS DISORDER: Status: ACTIVE | Noted: 2024-08-08

## 2024-08-08 PROBLEM — G93.0 CHOROID PLEXUS CYST: Status: ACTIVE | Noted: 2024-08-08

## 2024-08-08 PROBLEM — N93.8 DYSFUNCTIONAL UTERINE BLEEDING: Status: ACTIVE | Noted: 2024-08-08

## 2024-08-08 PROBLEM — J30.1 ALLERGIC RHINITIS DUE TO POLLEN: Status: ACTIVE | Noted: 2024-08-08

## 2024-08-08 PROBLEM — N63.0 BREAST LUMP OR MASS: Status: ACTIVE | Noted: 2024-08-08

## 2024-08-08 PROBLEM — M79.643 PAIN OF HAND: Status: ACTIVE | Noted: 2024-08-08

## 2024-08-08 PROBLEM — M94.0 COSTOCHONDRITIS: Status: ACTIVE | Noted: 2024-08-08

## 2024-08-08 PROBLEM — R87.610 ATYPICAL SQUAMOUS CELLS OF UNDETERMINED SIGNIFICANCE (ASCUS) ON PAPANICOLAOU SMEAR OF CERVIX: Status: ACTIVE | Noted: 2024-08-08

## 2024-08-08 PROBLEM — F41.9 ANXIETY: Status: ACTIVE | Noted: 2024-08-08

## 2024-08-08 PROBLEM — N64.4 BREAST PAIN: Status: ACTIVE | Noted: 2024-08-08

## 2024-08-08 PROBLEM — N94.10 DYSPAREUNIA IN FEMALE: Status: ACTIVE | Noted: 2024-08-08

## 2024-08-08 PROBLEM — R10.2 SUPRAPUBIC PAIN: Status: ACTIVE | Noted: 2024-08-08

## 2024-08-08 PROBLEM — G43.909 MIGRAINE HEADACHE: Status: ACTIVE | Noted: 2022-05-20

## 2024-08-08 PROBLEM — N89.8 VAGINAL ODOR: Status: ACTIVE | Noted: 2024-08-08

## 2024-08-08 PROBLEM — F32.A DEPRESSION: Status: ACTIVE | Noted: 2023-01-11

## 2024-08-08 PROBLEM — N94.6 DYSMENORRHEA: Status: ACTIVE | Noted: 2024-08-08

## 2024-08-13 ENCOUNTER — TELEPHONE (OUTPATIENT)
Facility: CLINIC | Age: 34
End: 2024-08-13

## 2024-08-13 NOTE — TELEPHONE ENCOUNTER
Patient called with concerns of ingrown hair on lower leg area. Area red and small bump with fluid filled liquid. Advised warm soaks to area and keep clean. If symptoms worsen to call office back.

## 2024-08-26 ENCOUNTER — OFFICE VISIT (OUTPATIENT)
Age: 34
End: 2024-08-26
Payer: COMMERCIAL

## 2024-08-26 VITALS
HEIGHT: 64 IN | WEIGHT: 130.19 LBS | BODY MASS INDEX: 22.23 KG/M2 | DIASTOLIC BLOOD PRESSURE: 82 MMHG | SYSTOLIC BLOOD PRESSURE: 125 MMHG

## 2024-08-26 DIAGNOSIS — Z00.00 ANNUAL VISIT FOR GENERAL ADULT MEDICAL EXAMINATION WITHOUT ABNORMAL FINDINGS: ICD-10-CM

## 2024-08-26 DIAGNOSIS — Z11.51 SCREENING FOR HUMAN PAPILLOMAVIRUS: ICD-10-CM

## 2024-08-26 DIAGNOSIS — Z01.419 PAP SMEAR, AS PART OF ROUTINE GYNECOLOGICAL EXAMINATION: Primary | ICD-10-CM

## 2024-08-26 PROCEDURE — 99385 PREV VISIT NEW AGE 18-39: CPT | Performed by: OBSTETRICS & GYNECOLOGY

## 2024-08-26 NOTE — PROGRESS NOTES
Ying Cole is a 34 y.o. female who presents today for the following:  Chief Complaint   Patient presents with    New Patient     Pt presents for annual exam with no complaints//eeley     Annual Exam        Allergies   Allergen Reactions    Toradol [Ketorolac Tromethamine] Itching     Itching to roof of mouth w/dry cough     Aspirin Other (See Comments)     Other reaction(s): Facial Swelling       Current Outpatient Medications   Medication Sig Dispense Refill    cyclobenzaprine (FLEXERIL) 5 MG tablet Take 1 tablet by mouth 2 times daily as needed      dicyclomine (BENTYL) 10 MG capsule Take 1 capsule by mouth 3 times daily as needed (abdominal cramping) 21 capsule 0    promethazine (PROMETHEGAN) 25 MG suppository Place 1 suppository rectally every 6 hours as needed for Nausea Related to migraines 10 suppository 0    ondansetron (ZOFRAN-ODT) 8 MG TBDP disintegrating tablet Take 1 tablet by mouth every 8 hours as needed for Nausea 90 tablet 0    butalbital-APAP-caffeine -40 MG CAPS per capsule Take 1 capsule by mouth every 4 hours as needed for Migraine 84 capsule 1    amitriptyline (ELAVIL) 10 MG tablet Take 2 tablets by mouth nightly 180 tablet 1    Biotin 2.5 MG CAPS Take by mouth      Cholecalciferol 50 MCG (2000 UT) TABS Take by mouth      ferrous sulfate (FE TABS 325) 325 (65 Fe) MG EC tablet Take 1 tablet by mouth 3 times daily (with meals)      ibuprofen (ADVIL;MOTRIN) 800 MG tablet Take by mouth      meclizine (ANTIVERT) 25 MG tablet Take 1 tablet by mouth 3 times daily as needed       No current facility-administered medications for this visit.       Past Medical History:   Diagnosis Date    Herpes genitalia     Kidney stones     Migraines     UTI (urinary tract infection)     Vitamin D deficiency        Past Surgical History:   Procedure Laterality Date     SECTION  2020       Family History   Adopted: Yes   Problem Relation Age of Onset    No Known Problems Mother     No Known

## 2024-08-27 LAB
., LABCORP: NORMAL
CYTOLOGIST CVX/VAG CYTO: NORMAL
CYTOLOGY CVX/VAG DOC CYTO: NORMAL
CYTOLOGY CVX/VAG DOC THIN PREP: NORMAL
DX ICD CODE: NORMAL
Lab: NORMAL
OTHER STN SPEC: NORMAL
STAT OF ADQ CVX/VAG CYTO-IMP: NORMAL

## 2025-02-05 ENCOUNTER — OFFICE VISIT (OUTPATIENT)
Age: 35
End: 2025-02-05

## 2025-02-05 VITALS
HEIGHT: 64 IN | HEART RATE: 84 BPM | OXYGEN SATURATION: 98 % | RESPIRATION RATE: 16 BRPM | WEIGHT: 133 LBS | TEMPERATURE: 97.7 F | DIASTOLIC BLOOD PRESSURE: 80 MMHG | BODY MASS INDEX: 22.71 KG/M2 | SYSTOLIC BLOOD PRESSURE: 112 MMHG

## 2025-02-05 DIAGNOSIS — G43.009 MIGRAINE WITHOUT AURA AND WITHOUT STATUS MIGRAINOSUS, NOT INTRACTABLE: ICD-10-CM

## 2025-02-05 DIAGNOSIS — Z76.89 ENCOUNTER TO ESTABLISH CARE: Primary | ICD-10-CM

## 2025-02-05 DIAGNOSIS — G43.701 CHRONIC MIGRAINE WITHOUT AURA WITH STATUS MIGRAINOSUS, NOT INTRACTABLE: ICD-10-CM

## 2025-02-05 DIAGNOSIS — R11.0 NAUSEA: ICD-10-CM

## 2025-02-05 RX ORDER — PROMETHAZINE HYDROCHLORIDE 25 MG/1
25 SUPPOSITORY RECTAL EVERY 6 HOURS PRN
Qty: 10 SUPPOSITORY | Refills: 0 | Status: SHIPPED | OUTPATIENT
Start: 2025-02-05

## 2025-02-05 RX ORDER — BUTALBITAL, ACETAMINOPHEN AND CAFFEINE 300; 40; 50 MG/1; MG/1; MG/1
1 CAPSULE ORAL EVERY 4 HOURS PRN
Qty: 84 CAPSULE | Refills: 1 | Status: SHIPPED | OUTPATIENT
Start: 2025-02-05

## 2025-02-05 RX ORDER — BUTALBITAL, ACETAMINOPHEN AND CAFFEINE 300; 40; 50 MG/1; MG/1; MG/1
1 CAPSULE ORAL EVERY 4 HOURS PRN
Qty: 84 CAPSULE | Refills: 1 | Status: SHIPPED | OUTPATIENT
Start: 2025-02-05 | End: 2025-02-05

## 2025-02-05 RX ORDER — AMITRIPTYLINE HYDROCHLORIDE 10 MG/1
20 TABLET ORAL NIGHTLY
Qty: 180 TABLET | Refills: 1 | Status: SHIPPED | OUTPATIENT
Start: 2025-02-05

## 2025-02-05 RX ORDER — CYCLOBENZAPRINE HCL 5 MG
5 TABLET ORAL 2 TIMES DAILY PRN
Qty: 60 TABLET | Refills: 3 | Status: SHIPPED | OUTPATIENT
Start: 2025-02-05

## 2025-02-05 SDOH — ECONOMIC STABILITY: FOOD INSECURITY: WITHIN THE PAST 12 MONTHS, THE FOOD YOU BOUGHT JUST DIDN'T LAST AND YOU DIDN'T HAVE MONEY TO GET MORE.: NEVER TRUE

## 2025-02-05 SDOH — ECONOMIC STABILITY: FOOD INSECURITY: WITHIN THE PAST 12 MONTHS, YOU WORRIED THAT YOUR FOOD WOULD RUN OUT BEFORE YOU GOT MONEY TO BUY MORE.: NEVER TRUE

## 2025-02-05 ASSESSMENT — ENCOUNTER SYMPTOMS
COUGH: 0
SHORTNESS OF BREATH: 0

## 2025-02-05 ASSESSMENT — PATIENT HEALTH QUESTIONNAIRE - PHQ9
SUM OF ALL RESPONSES TO PHQ QUESTIONS 1-9: 0
SUM OF ALL RESPONSES TO PHQ9 QUESTIONS 1 & 2: 0
SUM OF ALL RESPONSES TO PHQ QUESTIONS 1-9: 0
7. TROUBLE CONCENTRATING ON THINGS, SUCH AS READING THE NEWSPAPER OR WATCHING TELEVISION: NOT AT ALL
1. LITTLE INTEREST OR PLEASURE IN DOING THINGS: NOT AT ALL
4. FEELING TIRED OR HAVING LITTLE ENERGY: NOT AT ALL
6. FEELING BAD ABOUT YOURSELF - OR THAT YOU ARE A FAILURE OR HAVE LET YOURSELF OR YOUR FAMILY DOWN: NOT AT ALL
8. MOVING OR SPEAKING SO SLOWLY THAT OTHER PEOPLE COULD HAVE NOTICED. OR THE OPPOSITE, BEING SO FIGETY OR RESTLESS THAT YOU HAVE BEEN MOVING AROUND A LOT MORE THAN USUAL: NOT AT ALL
2. FEELING DOWN, DEPRESSED OR HOPELESS: NOT AT ALL
SUM OF ALL RESPONSES TO PHQ QUESTIONS 1-9: 0
10. IF YOU CHECKED OFF ANY PROBLEMS, HOW DIFFICULT HAVE THESE PROBLEMS MADE IT FOR YOU TO DO YOUR WORK, TAKE CARE OF THINGS AT HOME, OR GET ALONG WITH OTHER PEOPLE: NOT DIFFICULT AT ALL
5. POOR APPETITE OR OVEREATING: NOT AT ALL
9. THOUGHTS THAT YOU WOULD BE BETTER OFF DEAD, OR OF HURTING YOURSELF: NOT AT ALL
3. TROUBLE FALLING OR STAYING ASLEEP: NOT AT ALL
SUM OF ALL RESPONSES TO PHQ QUESTIONS 1-9: 0

## 2025-02-05 NOTE — PROGRESS NOTES
I have reviewed all needed documentation in preparation for visit. Verified patient by name and date of birth  Chief Complaint   Patient presents with    Establish Care       There were no vitals filed for this visit.    Health Maintenance Due   Topic Date Due    Varicella vaccine (1 of 2 - 13+ 2-dose series) Never done    HIV screen  Never done    Hepatitis B vaccine (1 of 3 - 19+ 3-dose series) Never done    Flu vaccine (1) 08/01/2024    Cervical cancer screen  08/27/2024    COVID-19 Vaccine (1 - 2023-24 season) Never done     \"Have you been to the ER, urgent care clinic since your last visit?  Hospitalized since your last visit?\"    NO    “Have you seen or consulted any other health care providers outside of [facility] since your last visit?”    NO     “Have you had a pap smear?”    NO    Date of last Cervical Cancer screen (HPV or PAP): 8/26/2024             Click Here for Release of Records Request         Vaibhav Coleman CCM
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
## 2025-04-06 ENCOUNTER — HOSPITAL ENCOUNTER (EMERGENCY)
Facility: HOSPITAL | Age: 35
Discharge: HOME | End: 2025-04-06
Attending: STUDENT IN AN ORGANIZED HEALTH CARE EDUCATION/TRAINING PROGRAM
Payer: COMMERCIAL

## 2025-04-06 VITALS
RESPIRATION RATE: 16 BRPM | DIASTOLIC BLOOD PRESSURE: 75 MMHG | HEART RATE: 94 BPM | OXYGEN SATURATION: 98 % | SYSTOLIC BLOOD PRESSURE: 106 MMHG | WEIGHT: 131 LBS | BODY MASS INDEX: 22.36 KG/M2 | HEIGHT: 64 IN | TEMPERATURE: 98.6 F

## 2025-04-06 DIAGNOSIS — S16.1XXA STRAIN OF NECK MUSCLE, INITIAL ENCOUNTER: Primary | ICD-10-CM

## 2025-04-06 DIAGNOSIS — S39.012A BACK STRAIN, INITIAL ENCOUNTER: ICD-10-CM

## 2025-04-06 LAB
ALBUMIN SERPL-MCNC: 3.7 G/DL (ref 3.5–5)
ALBUMIN/GLOB SERPL: 1.1 (ref 1.1–2.2)
ALP SERPL-CCNC: 72 U/L (ref 45–117)
ALT SERPL-CCNC: 24 U/L (ref 12–78)
ANION GAP SERPL CALC-SCNC: 11 MMOL/L (ref 2–12)
APPEARANCE UR: CLEAR
AST SERPL W P-5'-P-CCNC: 16 U/L (ref 15–37)
BACTERIA URNS QL MICRO: NEGATIVE /HPF
BASOPHILS # BLD: 0.04 K/UL (ref 0–0.1)
BASOPHILS NFR BLD: 0.5 % (ref 0–1)
BILIRUB SERPL-MCNC: 0.3 MG/DL (ref 0.2–1)
BILIRUB UR QL: NEGATIVE
BUN SERPL-MCNC: 16 MG/DL (ref 6–20)
BUN/CREAT SERPL: 29 (ref 12–20)
CA-I BLD-MCNC: 8.4 MG/DL (ref 8.5–10.1)
CHLORIDE SERPL-SCNC: 106 MMOL/L (ref 97–108)
CO2 SERPL-SCNC: 23 MMOL/L (ref 21–32)
COLOR UR: YELLOW
CREAT SERPL-MCNC: 0.56 MG/DL (ref 0.55–1.02)
DIFFERENTIAL METHOD BLD: ABNORMAL
EOSINOPHIL # BLD: 0.36 K/UL (ref 0–0.4)
EOSINOPHIL NFR BLD: 4.3 % (ref 0–7)
EPITH CASTS URNS QL MICRO: ABNORMAL /LPF
ERYTHROCYTE [DISTWIDTH] IN BLOOD BY AUTOMATED COUNT: 14.4 % (ref 11.5–14.5)
GLOBULIN SER CALC-MCNC: 3.4 G/DL (ref 2–4)
GLUCOSE SERPL-MCNC: 83 MG/DL (ref 65–100)
GLUCOSE UR STRIP.AUTO-MCNC: NEGATIVE MG/DL
HCG UR QL: NEGATIVE
HCT VFR BLD AUTO: 38.7 % (ref 35–47)
HGB BLD-MCNC: 13.3 G/DL (ref 11.5–16)
HGB UR QL STRIP: NEGATIVE
IMM GRANULOCYTES # BLD AUTO: 0 K/UL (ref 0–0.04)
IMM GRANULOCYTES NFR BLD AUTO: 0 % (ref 0–0.5)
KETONES UR QL STRIP.AUTO: NEGATIVE MG/DL
LEUKOCYTE ESTERASE UR QL STRIP.AUTO: NEGATIVE
LYMPHOCYTES # BLD: 3.62 K/UL (ref 0.8–3.5)
LYMPHOCYTES NFR BLD: 43.5 % (ref 12–49)
MCH RBC QN AUTO: 28.4 PG (ref 26–34)
MCHC RBC AUTO-ENTMCNC: 34.4 G/DL (ref 30–36.5)
MCV RBC AUTO: 82.5 FL (ref 80–99)
MONOCYTES # BLD: 0.6 K/UL (ref 0–1)
MONOCYTES NFR BLD: 7.2 % (ref 5–13)
NEUTS SEG # BLD: 3.71 K/UL (ref 1.8–8)
NEUTS SEG NFR BLD: 44.5 % (ref 32–75)
NITRITE UR QL STRIP.AUTO: NEGATIVE
PH UR STRIP: 7 (ref 5–8)
PLATELET # BLD AUTO: 248 K/UL (ref 150–400)
PMV BLD AUTO: 10.3 FL (ref 8.9–12.9)
POTASSIUM SERPL-SCNC: 3.6 MMOL/L (ref 3.5–5.1)
PROT SERPL-MCNC: 7.1 G/DL (ref 6.4–8.2)
PROT UR STRIP-MCNC: NEGATIVE MG/DL
RBC # BLD AUTO: 4.69 M/UL (ref 3.8–5.2)
RBC #/AREA URNS HPF: ABNORMAL /HPF (ref 0–5)
SODIUM SERPL-SCNC: 140 MMOL/L (ref 136–145)
SP GR UR REFRACTOMETRY: 1 (ref 1–1.03)
URINE CULTURE IF INDICATED: ABNORMAL
UROBILINOGEN UR QL STRIP.AUTO: 0.1 EU/DL (ref 0.2–1)
WBC # BLD AUTO: 8.3 K/UL (ref 3.6–11)
WBC URNS QL MICRO: ABNORMAL /HPF (ref 0–4)

## 2025-04-06 PROCEDURE — 80053 COMPREHEN METABOLIC PANEL: CPT

## 2025-04-06 PROCEDURE — 99283 EMERGENCY DEPT VISIT LOW MDM: CPT

## 2025-04-06 PROCEDURE — 81025 URINE PREGNANCY TEST: CPT

## 2025-04-06 PROCEDURE — 81001 URINALYSIS AUTO W/SCOPE: CPT

## 2025-04-06 PROCEDURE — 85025 COMPLETE CBC W/AUTO DIFF WBC: CPT

## 2025-04-06 PROCEDURE — 6370000000 HC RX 637 (ALT 250 FOR IP): Performed by: STUDENT IN AN ORGANIZED HEALTH CARE EDUCATION/TRAINING PROGRAM

## 2025-04-06 PROCEDURE — 36415 COLL VENOUS BLD VENIPUNCTURE: CPT

## 2025-04-06 RX ORDER — ACETAMINOPHEN 325 MG/1
650 TABLET ORAL
Status: COMPLETED | OUTPATIENT
Start: 2025-04-06 | End: 2025-04-06

## 2025-04-06 RX ORDER — CYCLOBENZAPRINE HCL 10 MG
10 TABLET ORAL NIGHTLY PRN
Qty: 10 TABLET | Refills: 0 | Status: SHIPPED | OUTPATIENT
Start: 2025-04-06 | End: 2025-04-16

## 2025-04-06 RX ORDER — LIDOCAINE 4 G/G
1 PATCH TOPICAL
Status: DISCONTINUED | OUTPATIENT
Start: 2025-04-06 | End: 2025-04-06 | Stop reason: HOSPADM

## 2025-04-06 RX ORDER — CYCLOBENZAPRINE HCL 10 MG
10 TABLET ORAL
Status: COMPLETED | OUTPATIENT
Start: 2025-04-06 | End: 2025-04-06

## 2025-04-06 RX ADMIN — CYCLOBENZAPRINE 10 MG: 10 TABLET, FILM COATED ORAL at 21:18

## 2025-04-06 RX ADMIN — ACETAMINOPHEN 650 MG: 325 TABLET ORAL at 21:16

## 2025-04-06 ASSESSMENT — LIFESTYLE VARIABLES
HOW MANY STANDARD DRINKS CONTAINING ALCOHOL DO YOU HAVE ON A TYPICAL DAY: 1 OR 2
HOW OFTEN DO YOU HAVE A DRINK CONTAINING ALCOHOL: MONTHLY OR LESS

## 2025-04-06 ASSESSMENT — PAIN DESCRIPTION - LOCATION: LOCATION: NECK

## 2025-04-06 ASSESSMENT — PAIN - FUNCTIONAL ASSESSMENT: PAIN_FUNCTIONAL_ASSESSMENT: 0-10

## 2025-04-06 ASSESSMENT — PAIN DESCRIPTION - DESCRIPTORS: DESCRIPTORS: ACHING

## 2025-04-06 ASSESSMENT — PAIN SCALES - GENERAL: PAINLEVEL_OUTOF10: 6

## 2025-04-06 NOTE — ED TRIAGE NOTES
Pt reporting left side pain since march 22nd when [facility] and was diagnosed with a stomach virus and a UTI.     Pt also complaining of pain to the back of her neck for the last week and a half. Pt reports pain with movement.     Hx of complex migraines.

## 2025-04-07 NOTE — ED PROVIDER NOTES
Negative      WBC, UA 0-4 0 - 4 /hpf    RBC, UA 0-5 0 - 5 /hpf    Epithelial Cells, UA Few Few /lpf    BACTERIA, URINE Negative Negative /hpf    Urine Culture if Indicated Culture not indicated by UA result Culture not indicated by UA result     CBC with Auto Differential    Collection Time: 04/06/25  9:12 PM   Result Value Ref Range    WBC 8.3 3.6 - 11.0 K/uL    RBC 4.69 3.80 - 5.20 M/uL    Hemoglobin 13.3 11.5 - 16.0 g/dL    Hematocrit 38.7 35.0 - 47.0 %    MCV 82.5 80.0 - 99.0 FL    MCH 28.4 26.0 - 34.0 PG    MCHC 34.4 30.0 - 36.5 g/dL    RDW 14.4 11.5 - 14.5 %    Platelets 248 150 - 400 K/uL    MPV 10.3 8.9 - 12.9 FL    Neutrophils % 44.5 32.0 - 75.0 %    Lymphocytes % 43.5 12.0 - 49.0 %    Monocytes % 7.2 5.0 - 13.0 %    Eosinophils % 4.3 0.0 - 7.0 %    Basophils % 0.5 0.0 - 1.0 %    Immature Granulocytes % 0.0 0.0 - 0.5 %    Neutrophils Absolute 3.71 1.80 - 8.00 K/UL    Lymphocytes Absolute 3.62 (H) 0.80 - 3.50 K/UL    Monocytes Absolute 0.60 0.00 - 1.00 K/UL    Eosinophils Absolute 0.36 0.00 - 0.40 K/UL    Basophils Absolute 0.04 0.00 - 0.10 K/UL    Immature Granulocytes Absolute 0.00 0.00 - 0.04 K/UL    Differential Type AUTOMATED     Comprehensive Metabolic Panel    Collection Time: 04/06/25  9:12 PM   Result Value Ref Range    Sodium 140 136 - 145 mmol/L    Potassium 3.6 3.5 - 5.1 mmol/L    Chloride 106 97 - 108 mmol/L    CO2 23 21 - 32 mmol/L    Anion Gap 11 2 - 12 mmol/L    Glucose 83 65 - 100 mg/dL    BUN 16 6 - 20 mg/dL    Creatinine 0.56 0.55 - 1.02 mg/dL    BUN/Creatinine Ratio 29 (H) 12 - 20      Est, Glom Filt Rate >90 >60 ml/min/1.73m2    Calcium 8.4 (L) 8.5 - 10.1 mg/dL    Total Bilirubin 0.3 0.2 - 1.0 mg/dL    AST 16 15 - 37 U/L    ALT 24 12 - 78 U/L    Alk Phosphatase 72 45 - 117 U/L    Total Protein 7.1 6.4 - 8.2 g/dL    Albumin 3.7 3.5 - 5.0 g/dL    Globulin 3.4 2.0 - 4.0 g/dL    Albumin/Globulin Ratio 1.1 1.1 - 2.2     POC Pregnancy Urine Qual    Collection Time: 04/06/25  9:14 PM   Result

## 2025-04-07 NOTE — DISCHARGE INSTRUCTIONS
(2) re-evaluation of changes in your illness and treatment, and  (3) for ongoing care.  If your symptoms become worse or you do not improve as expected and you are unable to reach your usual health care provider, you should return to the Emergency Department. We are available 24 hours a day.     Please take your discharge instructions with you when you go to your follow-up appointment.     If a prescription has been provided, please have it filled as soon as possible to prevent a delay in treatment. Read the entire medication instruction sheet provided to you by the pharmacy. If you have any questions or reservations about taking the medication due to side effects or interactions with other medications, please call your primary care physician or contact the ER to speak with the charge nurse.     Make an appointment with your family doctor or the physician you were referred to for follow-up of this visit as instructed on your discharge paperwork, as this is a mandatory follow-up. Return to the ER if you are unable to be seen or if you are unable to be seen in a timely manner.    If you have any problem arranging the follow-up visit, contact the Emergency Department immediately.

## 2025-05-06 ENCOUNTER — OFFICE VISIT (OUTPATIENT)
Age: 35
End: 2025-05-06
Payer: COMMERCIAL

## 2025-05-06 ENCOUNTER — TELEPHONE (OUTPATIENT)
Age: 35
End: 2025-05-06

## 2025-05-06 VITALS
BODY MASS INDEX: 22.75 KG/M2 | SYSTOLIC BLOOD PRESSURE: 117 MMHG | WEIGHT: 133.25 LBS | DIASTOLIC BLOOD PRESSURE: 87 MMHG | HEIGHT: 64 IN

## 2025-05-06 DIAGNOSIS — N92.6 IRREGULAR MENSES: Primary | ICD-10-CM

## 2025-05-06 PROCEDURE — 99213 OFFICE O/P EST LOW 20 MIN: CPT | Performed by: OBSTETRICS & GYNECOLOGY

## 2025-05-06 NOTE — PROGRESS NOTES
Ying Cole is a 35 y.o. female who presents today for the following:  Chief Complaint   Patient presents with    Vaginal Bleeding     Pt presents with complaints of periods becoming heavier with large clots x 6 months. Pt also states she feels \"lumps\" on her cervix//MKeeley     Other        Allergies   Allergen Reactions    Toradol [Ketorolac Tromethamine] Itching     Itching to roof of mouth w/dry cough     Aspirin Other (See Comments)     Other reaction(s): Facial Swelling       Current Outpatient Medications   Medication Sig Dispense Refill    amitriptyline (ELAVIL) 10 MG tablet Take 2 tablets by mouth nightly 180 tablet 1    cyclobenzaprine (FLEXERIL) 5 MG tablet Take 1 tablet by mouth 2 times daily as needed for Muscle spasms 60 tablet 3    promethazine (PROMETHEGAN) 25 MG suppository Place 1 suppository rectally every 6 hours as needed for Nausea Related to migraines 10 suppository 0    butalbital-APAP-caffeine -40 MG CAPS per capsule Take 1 capsule by mouth every 4 hours as needed for Migraine 84 capsule 1    ondansetron (ZOFRAN-ODT) 8 MG TBDP disintegrating tablet Take 1 tablet by mouth every 8 hours as needed for Nausea 90 tablet 0    Biotin 2.5 MG CAPS Take by mouth      Cholecalciferol 50 MCG (2000 UT) TABS Take by mouth      ibuprofen (ADVIL;MOTRIN) 800 MG tablet Take by mouth      meclizine (ANTIVERT) 25 MG tablet Take 1 tablet by mouth 3 times daily as needed       No current facility-administered medications for this visit.       Past Medical History:   Diagnosis Date    Herpes genitalia     Kidney stones     Migraines     UTI (urinary tract infection)     Vitamin D deficiency        Past Surgical History:   Procedure Laterality Date     SECTION  2020       Family History   Adopted: Yes   Problem Relation Age of Onset    No Known Problems Mother     No Known Problems Father     Hypertension Sister     Heart Disease Sister        Social History     Socioeconomic History

## 2025-05-06 NOTE — TELEPHONE ENCOUNTER
5/6/2025-You are allowed 2 guests to accompany you to your visit as long as they are over the age of 12. Children under the age of 12 are not allowed under any circumstances. Your appointment may be rescheduled if no one is available to be seated with them in the waiting room during your appointment. Please arrive 10-15 minutes ahead of your appointment to allot enough time to update any information in our system that we may need. If you arrive 15 minutes past your appointment time, we may ask you to reschedule.  ww

## 2025-06-12 RX ORDER — PREDNISONE 10 MG/1
TABLET ORAL
COMMUNITY
Start: 2025-04-10

## 2025-06-12 RX ORDER — PROPRANOLOL HCL 20 MG
TABLET ORAL
COMMUNITY

## 2025-06-16 ENCOUNTER — OFFICE VISIT (OUTPATIENT)
Age: 35
End: 2025-06-16
Payer: COMMERCIAL

## 2025-06-16 DIAGNOSIS — N92.6 IRREGULAR MENSES: Primary | ICD-10-CM

## 2025-06-16 PROCEDURE — 99213 OFFICE O/P EST LOW 20 MIN: CPT | Performed by: OBSTETRICS & GYNECOLOGY

## 2025-06-16 NOTE — PROGRESS NOTES
Ying Cole is a 35 y.o. female who presents today for the following:  Chief Complaint   Patient presents with    Results     Pt presents for ultrasound results for irregular periods//[facility]         Allergies   Allergen Reactions    Ketorolac Shortness Of Breath    Aspirin Other (See Comments) and Swelling     Other reaction(s): Facial Swelling    Other Reaction(s): Not available, Other (see comments)    Other reaction(s): Facial Swelling      Other Reaction(s): Not available    Toradol [Ketorolac Tromethamine] Itching     Itching to roof of mouth w/dry cough        Current Outpatient Medications   Medication Sig Dispense Refill    predniSONE (DELTASONE) 10 MG tablet       tiZANidine (ZANAFLEX) 4 MG tablet       propranolol (INDERAL) 20 MG tablet       amitriptyline (ELAVIL) 10 MG tablet Take 2 tablets by mouth nightly 180 tablet 1    cyclobenzaprine (FLEXERIL) 5 MG tablet Take 1 tablet by mouth 2 times daily as needed for Muscle spasms 60 tablet 3    promethazine (PROMETHEGAN) 25 MG suppository Place 1 suppository rectally every 6 hours as needed for Nausea Related to migraines 10 suppository 0    butalbital-APAP-caffeine -40 MG CAPS per capsule Take 1 capsule by mouth every 4 hours as needed for Migraine 84 capsule 1    ondansetron (ZOFRAN-ODT) 8 MG TBDP disintegrating tablet Take 1 tablet by mouth every 8 hours as needed for Nausea 90 tablet 0    Biotin 2.5 MG CAPS Take by mouth      Cholecalciferol 50 MCG (2000 UT) TABS Take by mouth      ibuprofen (ADVIL;MOTRIN) 800 MG tablet Take by mouth      meclizine (ANTIVERT) 25 MG tablet Take 1 tablet by mouth 3 times daily as needed       No current facility-administered medications for this visit.       Past Medical History:   Diagnosis Date    Herpes genitalia     Kidney stones     Migraines     UTI (urinary tract infection)     Vitamin D deficiency        Past Surgical History:   Procedure Laterality Date     SECTION  2020       Family